# Patient Record
Sex: MALE | Race: WHITE | Employment: OTHER | URBAN - NONMETROPOLITAN AREA
[De-identification: names, ages, dates, MRNs, and addresses within clinical notes are randomized per-mention and may not be internally consistent; named-entity substitution may affect disease eponyms.]

---

## 2020-01-01 ENCOUNTER — APPOINTMENT (OUTPATIENT)
Dept: CT IMAGING | Facility: OTHER | Age: 80
End: 2020-01-01
Attending: FAMILY MEDICINE
Payer: MEDICARE

## 2020-01-01 ENCOUNTER — ANCILLARY PROCEDURE (OUTPATIENT)
Dept: ULTRASOUND IMAGING | Facility: OTHER | Age: 80
End: 2020-01-01
Attending: FAMILY MEDICINE
Payer: MEDICARE

## 2020-01-01 ENCOUNTER — APPOINTMENT (OUTPATIENT)
Dept: GENERAL RADIOLOGY | Facility: OTHER | Age: 80
DRG: 177 | End: 2020-01-01
Attending: FAMILY MEDICINE

## 2020-01-01 ENCOUNTER — TELEPHONE (OUTPATIENT)
Dept: FAMILY MEDICINE | Facility: OTHER | Age: 80
End: 2020-01-01

## 2020-01-01 ENCOUNTER — APPOINTMENT (OUTPATIENT)
Dept: PHYSICAL THERAPY | Facility: OTHER | Age: 80
DRG: 177 | End: 2020-01-01
Attending: FAMILY MEDICINE

## 2020-01-01 ENCOUNTER — APPOINTMENT (OUTPATIENT)
Dept: CT IMAGING | Facility: OTHER | Age: 80
DRG: 177 | End: 2020-01-01
Attending: FAMILY MEDICINE

## 2020-01-01 ENCOUNTER — ANESTHESIA EVENT (OUTPATIENT)
Dept: EMERGENCY MEDICINE | Facility: OTHER | Age: 80
DRG: 177 | End: 2020-01-01

## 2020-01-01 ENCOUNTER — ANESTHESIA (OUTPATIENT)
Dept: EMERGENCY MEDICINE | Facility: OTHER | Age: 80
DRG: 177 | End: 2020-01-01

## 2020-01-01 ENCOUNTER — APPOINTMENT (OUTPATIENT)
Dept: SPEECH THERAPY | Facility: OTHER | Age: 80
DRG: 177 | End: 2020-01-01
Attending: FAMILY MEDICINE

## 2020-01-01 ENCOUNTER — CARE COORDINATION (OUTPATIENT)
Dept: FAMILY MEDICINE | Facility: OTHER | Age: 80
End: 2020-01-01

## 2020-01-01 ENCOUNTER — APPOINTMENT (OUTPATIENT)
Dept: OCCUPATIONAL THERAPY | Facility: OTHER | Age: 80
End: 2020-01-01
Attending: STUDENT IN AN ORGANIZED HEALTH CARE EDUCATION/TRAINING PROGRAM
Payer: COMMERCIAL

## 2020-01-01 ENCOUNTER — HOSPITAL ENCOUNTER (INPATIENT)
Facility: OTHER | Age: 80
LOS: 5 days | Discharge: HOME HEALTH | DRG: 177 | End: 2020-10-24
Attending: FAMILY MEDICINE | Admitting: FAMILY MEDICINE
Payer: COMMERCIAL

## 2020-01-01 ENCOUNTER — TELEPHONE (OUTPATIENT)
Dept: BEHAVIORAL HEALTH | Facility: CLINIC | Age: 80
End: 2020-01-01

## 2020-01-01 ENCOUNTER — HOSPITAL ENCOUNTER (EMERGENCY)
Facility: OTHER | Age: 80
Discharge: HOME | End: 2020-10-29
Attending: FAMILY MEDICINE | Admitting: FAMILY MEDICINE
Payer: MEDICARE

## 2020-01-01 ENCOUNTER — APPOINTMENT (OUTPATIENT)
Dept: PHYSICAL THERAPY | Facility: OTHER | Age: 80
DRG: 177 | End: 2020-01-01

## 2020-01-01 ENCOUNTER — APPOINTMENT (OUTPATIENT)
Dept: GENERAL RADIOLOGY | Facility: OTHER | Age: 80
End: 2020-01-01
Attending: FAMILY MEDICINE
Payer: MEDICARE

## 2020-01-01 ENCOUNTER — HOSPITAL ENCOUNTER (EMERGENCY)
Facility: OTHER | Age: 80
Discharge: HOME | End: 2020-10-27
Attending: PHYSICIAN ASSISTANT | Admitting: PHYSICIAN ASSISTANT
Payer: COMMERCIAL

## 2020-01-01 ENCOUNTER — APPOINTMENT (OUTPATIENT)
Dept: OCCUPATIONAL THERAPY | Facility: OTHER | Age: 80
DRG: 177 | End: 2020-01-01

## 2020-01-01 ENCOUNTER — APPOINTMENT (OUTPATIENT)
Dept: SPEECH THERAPY | Facility: OTHER | Age: 80
DRG: 177 | End: 2020-01-01

## 2020-01-01 ENCOUNTER — PATIENT OUTREACH (OUTPATIENT)
Dept: CARE COORDINATION | Facility: CLINIC | Age: 80
End: 2020-01-01

## 2020-01-01 VITALS
WEIGHT: 315 LBS | TEMPERATURE: 96.2 F | SYSTOLIC BLOOD PRESSURE: 145 MMHG | RESPIRATION RATE: 18 BRPM | HEART RATE: 82 BPM | BODY MASS INDEX: 41.75 KG/M2 | HEIGHT: 73 IN | OXYGEN SATURATION: 94 % | DIASTOLIC BLOOD PRESSURE: 63 MMHG

## 2020-01-01 VITALS
SYSTOLIC BLOOD PRESSURE: 147 MMHG | OXYGEN SATURATION: 93 % | RESPIRATION RATE: 20 BRPM | DIASTOLIC BLOOD PRESSURE: 76 MMHG | TEMPERATURE: 98 F | HEART RATE: 93 BPM | WEIGHT: 315 LBS | BODY MASS INDEX: 48.55 KG/M2

## 2020-01-01 VITALS
BODY MASS INDEX: 42.66 KG/M2 | HEART RATE: 86 BPM | DIASTOLIC BLOOD PRESSURE: 53 MMHG | RESPIRATION RATE: 20 BRPM | SYSTOLIC BLOOD PRESSURE: 116 MMHG | HEIGHT: 72 IN | TEMPERATURE: 96.4 F | WEIGHT: 315 LBS | OXYGEN SATURATION: 94 %
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
## 2020-01-01 DIAGNOSIS — F07.81 POST CONCUSSIVE SYNDROME: ICD-10-CM

## 2020-01-01 DIAGNOSIS — F43.10 PTSD (POST-TRAUMATIC STRESS DISORDER): ICD-10-CM

## 2020-01-01 DIAGNOSIS — F03.918 SENILE DEMENTIA, WITH BEHAVIORAL DISTURBANCE (H): ICD-10-CM

## 2020-01-01 DIAGNOSIS — S01.81XA FACIAL LACERATION, INITIAL ENCOUNTER: ICD-10-CM

## 2020-01-01 DIAGNOSIS — T14.90XA TRAUMA: ICD-10-CM

## 2020-01-01 DIAGNOSIS — J18.9 COMMUNITY ACQUIRED PNEUMONIA, UNSPECIFIED LATERALITY: ICD-10-CM

## 2020-01-01 DIAGNOSIS — J18.9 PNEUMONIA DUE TO INFECTIOUS ORGANISM, UNSPECIFIED LATERALITY, UNSPECIFIED PART OF LUNG: ICD-10-CM

## 2020-01-01 DIAGNOSIS — J69.0 ASPIRATION PNEUMONIA, UNSPECIFIED ASPIRATION PNEUMONIA TYPE, UNSPECIFIED LATERALITY, UNSPECIFIED PART OF LUNG (H): ICD-10-CM

## 2020-01-01 DIAGNOSIS — Z20.828 CONTACT WITH AND (SUSPECTED) EXPOSURE TO OTHER VIRAL COMMUNICABLE DISEASES: ICD-10-CM

## 2020-01-01 DIAGNOSIS — Z74.09 IMPAIRED MOBILITY AND ADLS: ICD-10-CM
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
## 2020-01-01 DIAGNOSIS — J43.1 PANLOBULAR EMPHYSEMA (H): ICD-10-CM

## 2020-01-01 DIAGNOSIS — S20.219D CONTUSION OF CHEST WALL, UNSPECIFIED LATERALITY, SUBSEQUENT ENCOUNTER: ICD-10-CM
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
## 2020-01-01 DIAGNOSIS — J69.0 ASPIRATION PNEUMONIA, UNSPECIFIED ASPIRATION PNEUMONIA TYPE, UNSPECIFIED LATERALITY, UNSPECIFIED PART OF LUNG (H): Primary | ICD-10-CM

## 2020-01-01 DIAGNOSIS — R41.0 DELIRIUM: ICD-10-CM

## 2020-01-01 DIAGNOSIS — S01.111A RIGHT EYELID LACERATION, INITIAL ENCOUNTER: ICD-10-CM

## 2020-01-01 DIAGNOSIS — Z78.9 IMPAIRED MOBILITY AND ADLS: ICD-10-CM
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
## 2020-01-01 DIAGNOSIS — Z99.81 SUPPLEMENTAL OXYGEN DEPENDENT: ICD-10-CM

## 2020-01-01 DIAGNOSIS — S22.42XD CLOSED FRACTURE OF MULTIPLE RIBS OF LEFT SIDE WITH ROUTINE HEALING, SUBSEQUENT ENCOUNTER: ICD-10-CM

## 2020-01-01 LAB
ABO + RH BLD: NORMAL
ALBUMIN SERPL-MCNC: 3.6 G/DL (ref 3.5–5.7)
ALBUMIN SERPL-MCNC: 3.8 G/DL (ref 3.5–5.7)
ALBUMIN SERPL-MCNC: 4 G/DL (ref 3.5–5.7)
ALBUMIN UR-MCNC: NEGATIVE MG/DL
ALBUMIN UR-MCNC: NEGATIVE MG/DL
ALP SERPL-CCNC: 53 U/L (ref 34–104)
ALP SERPL-CCNC: 59 U/L (ref 34–104)
ALP SERPL-CCNC: 66 U/L (ref 34–104)
ALT SERPL W P-5'-P-CCNC: 36 U/L (ref 7–52)
ALT SERPL W P-5'-P-CCNC: 40 U/L (ref 7–52)
ALT SERPL W P-5'-P-CCNC: 42 U/L (ref 7–52)
ANION GAP SERPL CALCULATED.3IONS-SCNC: 4 MMOL/L (ref 3–14)
ANION GAP SERPL CALCULATED.3IONS-SCNC: 5 MMOL/L (ref 3–14)
ANION GAP SERPL CALCULATED.3IONS-SCNC: 7 MMOL/L (ref 3–14)
ANION GAP SERPL CALCULATED.3IONS-SCNC: 7 MMOL/L (ref 3–14)
ANION GAP SERPL CALCULATED.3IONS-SCNC: 8 MMOL/L (ref 3–14)
APPEARANCE UR: CLEAR
APPEARANCE UR: CLEAR
APTT PPP: 27 SEC (ref 22–37)
AST SERPL W P-5'-P-CCNC: 33 U/L (ref 13–39)
AST SERPL W P-5'-P-CCNC: 47 U/L (ref 13–39)
AST SERPL W P-5'-P-CCNC: 56 U/L (ref 13–39)
BACTERIA SPEC CULT: NORMAL
BACTERIA SPEC CULT: NORMAL
BASOPHILS # BLD AUTO: 0 10E9/L (ref 0–0.2)
BASOPHILS # BLD AUTO: 0.1 10E9/L (ref 0–0.2)
BASOPHILS NFR BLD AUTO: 0.2 %
BASOPHILS NFR BLD AUTO: 0.3 %
BASOPHILS NFR BLD AUTO: 0.7 %
BASOPHILS NFR BLD AUTO: 0.8 %
BILIRUB SERPL-MCNC: 0.3 MG/DL (ref 0.3–1)
BILIRUB SERPL-MCNC: 0.4 MG/DL (ref 0.3–1)
BILIRUB SERPL-MCNC: 0.4 MG/DL (ref 0.3–1)
BILIRUB UR QL STRIP: NEGATIVE
BILIRUB UR QL STRIP: NEGATIVE
BLD GP AB SCN SERPL QL: NORMAL
BLD GP AB SCN SERPL QL: NORMAL
BLOOD BANK CMNT PATIENT-IMP: NORMAL
BLOOD BANK CMNT PATIENT-IMP: NORMAL
BUN SERPL-MCNC: 19 MG/DL (ref 7–25)
BUN SERPL-MCNC: 20 MG/DL (ref 7–25)
BUN SERPL-MCNC: 23 MG/DL (ref 7–25)
BUN SERPL-MCNC: 29 MG/DL (ref 7–25)
BUN SERPL-MCNC: 32 MG/DL (ref 7–25)
CALCIUM SERPL-MCNC: 8.6 MG/DL (ref 8.6–10.3)
CALCIUM SERPL-MCNC: 8.7 MG/DL (ref 8.6–10.3)
CALCIUM SERPL-MCNC: 8.9 MG/DL (ref 8.6–10.3)
CALCIUM SERPL-MCNC: 9.1 MG/DL (ref 8.6–10.3)
CALCIUM SERPL-MCNC: 9.4 MG/DL (ref 8.6–10.3)
CHLORIDE SERPL-SCNC: 93 MMOL/L (ref 98–107)
CHLORIDE SERPL-SCNC: 94 MMOL/L (ref 98–107)
CHLORIDE SERPL-SCNC: 95 MMOL/L (ref 98–107)
CHLORIDE SERPL-SCNC: 97 MMOL/L (ref 98–107)
CHLORIDE SERPL-SCNC: 98 MMOL/L (ref 98–107)
CO2 SERPL-SCNC: 30 MMOL/L (ref 21–31)
CO2 SERPL-SCNC: 33 MMOL/L (ref 21–31)
CO2 SERPL-SCNC: 34 MMOL/L (ref 21–31)
COLOR UR AUTO: NORMAL
COLOR UR AUTO: NORMAL
CREAT SERPL-MCNC: 1.03 MG/DL (ref 0.7–1.3)
CREAT SERPL-MCNC: 1.04 MG/DL (ref 0.7–1.3)
CREAT SERPL-MCNC: 1.12 MG/DL (ref 0.7–1.3)
CREAT SERPL-MCNC: 1.19 MG/DL (ref 0.7–1.3)
CREAT SERPL-MCNC: 1.26 MG/DL (ref 0.7–1.3)
DIFFERENTIAL METHOD BLD: ABNORMAL
EOSINOPHIL # BLD AUTO: 0 10E9/L (ref 0–0.7)
EOSINOPHIL # BLD AUTO: 0.2 10E9/L (ref 0–0.7)
EOSINOPHIL # BLD AUTO: 0.3 10E9/L (ref 0–0.7)
EOSINOPHIL # BLD AUTO: 0.3 10E9/L (ref 0–0.7)
EOSINOPHIL NFR BLD AUTO: 0.1 %
EOSINOPHIL NFR BLD AUTO: 1.4 %
EOSINOPHIL NFR BLD AUTO: 3.2 %
EOSINOPHIL NFR BLD AUTO: 3.9 %
ERYTHROCYTE [DISTWIDTH] IN BLOOD BY AUTOMATED COUNT: 14.4 % (ref 10–15)
ERYTHROCYTE [DISTWIDTH] IN BLOOD BY AUTOMATED COUNT: 14.6 % (ref 10–15)
ERYTHROCYTE [DISTWIDTH] IN BLOOD BY AUTOMATED COUNT: 14.7 % (ref 10–15)
ERYTHROCYTE [DISTWIDTH] IN BLOOD BY AUTOMATED COUNT: 14.8 % (ref 10–15)
ERYTHROCYTE [DISTWIDTH] IN BLOOD BY AUTOMATED COUNT: 15.3 % (ref 10–15)
ETHANOL SERPL-MCNC: <0.01 %
GFR SERPL CREATININE-BSD FRML MDRD: 55 ML/MIN/{1.73_M2}
GFR SERPL CREATININE-BSD FRML MDRD: 59 ML/MIN/{1.73_M2}
GFR SERPL CREATININE-BSD FRML MDRD: 63 ML/MIN/{1.73_M2}
GFR SERPL CREATININE-BSD FRML MDRD: 69 ML/MIN/{1.73_M2}
GFR SERPL CREATININE-BSD FRML MDRD: 70 ML/MIN/{1.73_M2}
GLUCOSE SERPL-MCNC: 144 MG/DL (ref 70–105)
GLUCOSE SERPL-MCNC: 216 MG/DL (ref 70–105)
GLUCOSE SERPL-MCNC: 250 MG/DL (ref 70–105)
GLUCOSE SERPL-MCNC: 255 MG/DL (ref 70–105)
GLUCOSE SERPL-MCNC: 307 MG/DL (ref 70–105)
GLUCOSE UR STRIP-MCNC: NEGATIVE MG/DL
GLUCOSE UR STRIP-MCNC: NEGATIVE MG/DL
HBA1C MFR BLD: 7.9 % (ref 4–6)
HCO3 BLD-SCNC: 29 MMOL/L (ref 21–28)
HCO3 BLDV-SCNC: 36 MMOL/L (ref 21–28)
HCT VFR BLD AUTO: 30.8 % (ref 40–53)
HCT VFR BLD AUTO: 33 % (ref 40–53)
HCT VFR BLD AUTO: 33.6 % (ref 40–53)
HCT VFR BLD AUTO: 35.6 % (ref 40–53)
HCT VFR BLD AUTO: 37 % (ref 40–53)
HGB BLD-MCNC: 10 G/DL (ref 13.3–17.7)
HGB BLD-MCNC: 10.2 G/DL (ref 13.3–17.7)
HGB BLD-MCNC: 10.9 G/DL (ref 13.3–17.7)
HGB BLD-MCNC: 11.5 G/DL (ref 13.3–17.7)
HGB BLD-MCNC: 9.5 G/DL (ref 13.3–17.7)
HGB UR QL STRIP: NEGATIVE
HGB UR QL STRIP: NEGATIVE
IMM GRANULOCYTES # BLD: 0.1 10E9/L (ref 0–0.4)
IMM GRANULOCYTES NFR BLD: 0.5 %
IMM GRANULOCYTES NFR BLD: 0.6 %
IMM GRANULOCYTES NFR BLD: 0.8 %
IMM GRANULOCYTES NFR BLD: 0.9 %
INR PPP: 0.91 (ref 0.86–1.14)
INTERPRETATION ECG - MUSE: NORMAL
KETONES UR STRIP-MCNC: NEGATIVE MG/DL
KETONES UR STRIP-MCNC: NEGATIVE MG/DL
LABORATORY COMMENT REPORT: NORMAL
LACTATE BLD-SCNC: 0.7 MMOL/L (ref 0.7–2)
LACTATE BLD-SCNC: 1 MMOL/L (ref 0.7–2)
LACTATE BLD-SCNC: 1.6 MMOL/L (ref 0.7–2)
LACTATE BLD-SCNC: 1.7 MMOL/L (ref 0.7–2)
LEUKOCYTE ESTERASE UR QL STRIP: NEGATIVE
LEUKOCYTE ESTERASE UR QL STRIP: NEGATIVE
LIPASE SERPL-CCNC: 240 U/L (ref 11–82)
LYMPHOCYTES # BLD AUTO: 0.8 10E9/L (ref 0.8–5.3)
LYMPHOCYTES # BLD AUTO: 1 10E9/L (ref 0.8–5.3)
LYMPHOCYTES # BLD AUTO: 1.7 10E9/L (ref 0.8–5.3)
LYMPHOCYTES # BLD AUTO: 1.7 10E9/L (ref 0.8–5.3)
LYMPHOCYTES NFR BLD AUTO: 11.1 %
LYMPHOCYTES NFR BLD AUTO: 16.6 %
LYMPHOCYTES NFR BLD AUTO: 6.9 %
LYMPHOCYTES NFR BLD AUTO: 9.5 %
MAGNESIUM SERPL-MCNC: 1.6 MG/DL (ref 1.9–2.7)
MAGNESIUM SERPL-MCNC: 1.9 MG/DL (ref 1.9–2.7)
MCH RBC QN AUTO: 25.4 PG (ref 26.5–33)
MCH RBC QN AUTO: 26 PG (ref 26.5–33)
MCH RBC QN AUTO: 26.1 PG (ref 26.5–33)
MCH RBC QN AUTO: 26.1 PG (ref 26.5–33)
MCH RBC QN AUTO: 26.2 PG (ref 26.5–33)
MCHC RBC AUTO-ENTMCNC: 29.8 G/DL (ref 31.5–36.5)
MCHC RBC AUTO-ENTMCNC: 30.6 G/DL (ref 31.5–36.5)
MCHC RBC AUTO-ENTMCNC: 30.8 G/DL (ref 31.5–36.5)
MCHC RBC AUTO-ENTMCNC: 30.9 G/DL (ref 31.5–36.5)
MCHC RBC AUTO-ENTMCNC: 31.1 G/DL (ref 31.5–36.5)
MCV RBC AUTO: 84 FL (ref 78–100)
MCV RBC AUTO: 84 FL (ref 78–100)
MCV RBC AUTO: 85 FL (ref 78–100)
MCV RBC AUTO: 85 FL (ref 78–100)
MCV RBC AUTO: 86 FL (ref 78–100)
MONOCYTES # BLD AUTO: 0.8 10E9/L (ref 0–1.3)
MONOCYTES # BLD AUTO: 0.9 10E9/L (ref 0–1.3)
MONOCYTES # BLD AUTO: 1 10E9/L (ref 0–1.3)
MONOCYTES # BLD AUTO: 1.1 10E9/L (ref 0–1.3)
MONOCYTES NFR BLD AUTO: 6.8 %
MONOCYTES NFR BLD AUTO: 7.6 %
MONOCYTES NFR BLD AUTO: 8.7 %
MONOCYTES NFR BLD AUTO: 9 %
NEUTROPHILS # BLD AUTO: 11.9 10E9/L (ref 1.6–8.3)
NEUTROPHILS # BLD AUTO: 11.9 10E9/L (ref 1.6–8.3)
NEUTROPHILS # BLD AUTO: 6.5 10E9/L (ref 1.6–8.3)
NEUTROPHILS # BLD AUTO: 7.1 10E9/L (ref 1.6–8.3)
NEUTROPHILS NFR BLD AUTO: 70 %
NEUTROPHILS NFR BLD AUTO: 76.2 %
NEUTROPHILS NFR BLD AUTO: 79.5 %
NEUTROPHILS NFR BLD AUTO: 84.7 %
NITRATE UR QL: NEGATIVE
NITRATE UR QL: NEGATIVE
NT-PROBNP SERPL-MCNC: 77 PG/ML (ref 0–100)
O2/TOTAL GAS SETTING VFR VENT: 28 %
O2/TOTAL GAS SETTING VFR VENT: 60 %
O2/TOTAL GAS SETTING VFR VENT: ABNORMAL %
O2/TOTAL GAS SETTING VFR VENT: ABNORMAL %
OXYHGB MFR BLD: 91 % (ref 92–100)
OXYHGB MFR BLD: 92 % (ref 92–100)
OXYHGB MFR BLD: 96 % (ref 92–100)
OXYHGB MFR BLDV: 47 %
PCO2 BLD: 52 MM HG (ref 35–45)
PCO2 BLD: 56 MM HG (ref 35–45)
PCO2 BLD: 58 MM HG (ref 35–45)
PCO2 BLDV: 56 MM HG (ref 40–50)
PH BLD: 7.31 PH (ref 7.35–7.45)
PH BLD: 7.33 PH (ref 7.35–7.45)
PH BLD: 7.35 PH (ref 7.35–7.45)
PH BLDV: 7.41 PH (ref 7.32–7.43)
PH UR STRIP: 5.5 PH (ref 5–7)
PH UR STRIP: 7 PH (ref 5–7)
PLATELET # BLD AUTO: 278 10E9/L (ref 150–450)
PLATELET # BLD AUTO: 297 10E9/L (ref 150–450)
PLATELET # BLD AUTO: 307 10E9/L (ref 150–450)
PLATELET # BLD AUTO: 308 10E9/L (ref 150–450)
PLATELET # BLD AUTO: 333 10E9/L (ref 150–450)
PO2 BLD: 66 MM HG (ref 80–105)
PO2 BLD: 71 MM HG (ref 80–105)
PO2 BLD: 90 MM HG (ref 80–105)
PO2 BLDV: 29 MM HG (ref 25–47)
POTASSIUM SERPL-SCNC: 4.2 MMOL/L (ref 3.5–5.1)
POTASSIUM SERPL-SCNC: 4.7 MMOL/L (ref 3.5–5.1)
POTASSIUM SERPL-SCNC: 4.8 MMOL/L (ref 3.5–5.1)
PROT SERPL-MCNC: 6.3 G/DL (ref 6.4–8.9)
PROT SERPL-MCNC: 6.7 G/DL (ref 6.4–8.9)
PROT SERPL-MCNC: 7 G/DL (ref 6.4–8.9)
RBC # BLD AUTO: 3.66 10E12/L (ref 4.4–5.9)
RBC # BLD AUTO: 3.9 10E12/L (ref 4.4–5.9)
RBC # BLD AUTO: 3.93 10E12/L (ref 4.4–5.9)
RBC # BLD AUTO: 4.17 10E12/L (ref 4.4–5.9)
RBC # BLD AUTO: 4.4 10E12/L (ref 4.4–5.9)
SARS-COV-2 RNA SPEC QL NAA+PROBE: NEGATIVE
SARS-COV-2 RNA SPEC QL NAA+PROBE: NORMAL
SODIUM SERPL-SCNC: 131 MMOL/L (ref 134–144)
SODIUM SERPL-SCNC: 132 MMOL/L (ref 134–144)
SODIUM SERPL-SCNC: 133 MMOL/L (ref 134–144)
SODIUM SERPL-SCNC: 134 MMOL/L (ref 134–144)
SODIUM SERPL-SCNC: 135 MMOL/L (ref 134–144)
SOURCE: NORMAL
SOURCE: NORMAL
SP GR UR STRIP: 1.01 (ref 1–1.03)
SP GR UR STRIP: 1.03 (ref 1–1.03)
SPECIMEN EXP DATE BLD: NORMAL
SPECIMEN EXP DATE BLD: NORMAL
SPECIMEN SOURCE: NORMAL
TROPONIN I SERPL-MCNC: 12.6 PG/ML
UROBILINOGEN UR STRIP-MCNC: NORMAL MG/DL (ref 0–2)
UROBILINOGEN UR STRIP-MCNC: NORMAL MG/DL (ref 0–2)
WBC # BLD AUTO: 10.1 10E9/L (ref 4–11)
WBC # BLD AUTO: 11.7 10E9/L (ref 4–11)
WBC # BLD AUTO: 13.9 10E9/L (ref 4–11)
WBC # BLD AUTO: 15 10E9/L (ref 4–11)
WBC # BLD AUTO: 8.5 10E9/L (ref 4–11)

## 2020-01-01 PROCEDURE — 97129 THER IVNTJ 1ST 15 MIN: CPT | Performed by: OCCUPATIONAL THERAPIST

## 2020-01-01 PROCEDURE — 999N000157 HC STATISTIC RCP TIME EA 10 MIN

## 2020-01-01 PROCEDURE — 36415 COLL VENOUS BLD VENIPUNCTURE: CPT | Performed by: FAMILY MEDICINE

## 2020-01-01 PROCEDURE — 250N000011 HC RX IP 250 OP 636: Performed by: FAMILY MEDICINE

## 2020-01-01 PROCEDURE — 97165 OT EVAL LOW COMPLEX 30 MIN: CPT | Mod: GO | Performed by: OCCUPATIONAL THERAPIST

## 2020-01-01 PROCEDURE — 250N000013 HC RX MED GY IP 250 OP 250 PS 637: Performed by: FAMILY MEDICINE

## 2020-01-01 PROCEDURE — 250N000009 HC RX 250: Performed by: FAMILY MEDICINE

## 2020-01-01 PROCEDURE — 96365 THER/PROPH/DIAG IV INF INIT: CPT | Performed by: FAMILY MEDICINE

## 2020-01-01 PROCEDURE — 120N000001 HC R&B MED SURG/OB

## 2020-01-01 PROCEDURE — 85027 COMPLETE CBC AUTOMATED: CPT | Performed by: FAMILY MEDICINE

## 2020-01-01 PROCEDURE — 70486 CT MAXILLOFACIAL W/O DYE: CPT

## 2020-01-01 PROCEDURE — 70486 CT MAXILLOFACIAL W/O DYE: CPT | Performed by: NURSE ANESTHETIST, CERTIFIED REGISTERED

## 2020-01-01 PROCEDURE — 83605 ASSAY OF LACTIC ACID: CPT | Performed by: FAMILY MEDICINE

## 2020-01-01 PROCEDURE — 84484 ASSAY OF TROPONIN QUANT: CPT | Performed by: FAMILY MEDICINE

## 2020-01-01 PROCEDURE — 99285 EMERGENCY DEPT VISIT HI MDM: CPT | Mod: 25 | Performed by: FAMILY MEDICINE

## 2020-01-01 PROCEDURE — 93005 ELECTROCARDIOGRAM TRACING: CPT | Mod: 76 | Performed by: FAMILY MEDICINE

## 2020-01-01 PROCEDURE — 83735 ASSAY OF MAGNESIUM: CPT | Performed by: FAMILY MEDICINE

## 2020-01-01 PROCEDURE — 71260 CT THORAX DX C+: CPT

## 2020-01-01 PROCEDURE — 80053 COMPREHEN METABOLIC PANEL: CPT | Performed by: FAMILY MEDICINE

## 2020-01-01 PROCEDURE — 80048 BASIC METABOLIC PNL TOTAL CA: CPT | Performed by: PHYSICIAN ASSISTANT

## 2020-01-01 PROCEDURE — 258N000003 HC RX IP 258 OP 636: Performed by: FAMILY MEDICINE

## 2020-01-01 PROCEDURE — 36415 COLL VENOUS BLD VENIPUNCTURE: CPT | Performed by: PHYSICIAN ASSISTANT

## 2020-01-01 PROCEDURE — 85730 THROMBOPLASTIN TIME PARTIAL: CPT | Performed by: FAMILY MEDICINE

## 2020-01-01 PROCEDURE — 12011 RPR F/E/E/N/L/M 2.5 CM/<: CPT | Performed by: FAMILY MEDICINE

## 2020-01-01 PROCEDURE — 71045 X-RAY EXAM CHEST 1 VIEW: CPT

## 2020-01-01 PROCEDURE — 99285 EMERGENCY DEPT VISIT HI MDM: CPT | Performed by: FAMILY MEDICINE

## 2020-01-01 PROCEDURE — 96376 TX/PRO/DX INJ SAME DRUG ADON: CPT | Performed by: FAMILY MEDICINE

## 2020-01-01 PROCEDURE — 96372 THER/PROPH/DIAG INJ SC/IM: CPT | Performed by: FAMILY MEDICINE

## 2020-01-01 PROCEDURE — 86900 BLOOD TYPING SEROLOGIC ABO: CPT | Performed by: FAMILY MEDICINE

## 2020-01-01 PROCEDURE — 92526 ORAL FUNCTION THERAPY: CPT | Mod: GN

## 2020-01-01 PROCEDURE — 82805 BLOOD GASES W/O2 SATURATION: CPT | Performed by: FAMILY MEDICINE

## 2020-01-01 PROCEDURE — 250N000012 HC RX MED GY IP 250 OP 636 PS 637: Performed by: FAMILY MEDICINE

## 2020-01-01 PROCEDURE — 86901 BLOOD TYPING SEROLOGIC RH(D): CPT | Performed by: FAMILY MEDICINE

## 2020-01-01 PROCEDURE — 76705 ECHO EXAM OF ABDOMEN: CPT | Mod: TC | Performed by: FAMILY MEDICINE

## 2020-01-01 PROCEDURE — 80048 BASIC METABOLIC PNL TOTAL CA: CPT | Performed by: FAMILY MEDICINE

## 2020-01-01 PROCEDURE — 72131 CT LUMBAR SPINE W/O DYE: CPT

## 2020-01-01 PROCEDURE — 255N000002 HC RX 255 OP 636: Performed by: FAMILY MEDICINE

## 2020-01-01 PROCEDURE — 250N000013 HC RX MED GY IP 250 OP 250 PS 637: Performed by: STUDENT IN AN ORGANIZED HEALTH CARE EDUCATION/TRAINING PROGRAM

## 2020-01-01 PROCEDURE — 97116 GAIT TRAINING THERAPY: CPT | Mod: GP

## 2020-01-01 PROCEDURE — 99232 SBSQ HOSP IP/OBS MODERATE 35: CPT | Performed by: FAMILY MEDICINE

## 2020-01-01 PROCEDURE — U0003 INFECTIOUS AGENT DETECTION BY NUCLEIC ACID (DNA OR RNA); SEVERE ACUTE RESPIRATORY SYNDROME CORONAVIRUS 2 (SARS-COV-2) (CORONAVIRUS DISEASE [COVID-19]), AMPLIFIED PROBE TECHNIQUE, MAKING USE OF HIGH THROUGHPUT TECHNOLOGIES AS DESCRIBED BY CMS-2020-01-R: HCPCS | Performed by: FAMILY MEDICINE

## 2020-01-01 PROCEDURE — 96375 TX/PRO/DX INJ NEW DRUG ADDON: CPT | Performed by: FAMILY MEDICINE

## 2020-01-01 PROCEDURE — 85025 COMPLETE CBC W/AUTO DIFF WBC: CPT | Performed by: FAMILY MEDICINE

## 2020-01-01 PROCEDURE — 99284 EMERGENCY DEPT VISIT MOD MDM: CPT | Performed by: FAMILY MEDICINE

## 2020-01-01 PROCEDURE — 72125 CT NECK SPINE W/O DYE: CPT

## 2020-01-01 PROCEDURE — C9803 HOPD COVID-19 SPEC COLLECT: HCPCS | Performed by: FAMILY MEDICINE

## 2020-01-01 PROCEDURE — 83690 ASSAY OF LIPASE: CPT | Performed by: FAMILY MEDICINE

## 2020-01-01 PROCEDURE — 86850 RBC ANTIBODY SCREEN: CPT | Performed by: FAMILY MEDICINE

## 2020-01-01 PROCEDURE — 683N000002 HC PARTIAL TRAUMA W/O CC LEVEL III: Performed by: FAMILY MEDICINE

## 2020-01-01 PROCEDURE — 85025 COMPLETE CBC W/AUTO DIFF WBC: CPT | Performed by: PHYSICIAN ASSISTANT

## 2020-01-01 PROCEDURE — 36600 WITHDRAWAL OF ARTERIAL BLOOD: CPT | Performed by: FAMILY MEDICINE

## 2020-01-01 PROCEDURE — 81003 URINALYSIS AUTO W/O SCOPE: CPT | Performed by: FAMILY MEDICINE

## 2020-01-01 PROCEDURE — 96366 THER/PROPH/DIAG IV INF ADDON: CPT | Performed by: FAMILY MEDICINE

## 2020-01-01 PROCEDURE — 200N000001 HC R&B ICU

## 2020-01-01 PROCEDURE — 70450 CT HEAD/BRAIN W/O DYE: CPT

## 2020-01-01 PROCEDURE — 97530 THERAPEUTIC ACTIVITIES: CPT | Mod: GP

## 2020-01-01 PROCEDURE — 92610 EVALUATE SWALLOWING FUNCTION: CPT | Mod: GN

## 2020-01-01 PROCEDURE — 99223 1ST HOSP IP/OBS HIGH 75: CPT | Performed by: FAMILY MEDICINE

## 2020-01-01 PROCEDURE — 93005 ELECTROCARDIOGRAM TRACING: CPT | Performed by: FAMILY MEDICINE

## 2020-01-01 PROCEDURE — 250N000011 HC RX IP 250 OP 636: Performed by: NURSE ANESTHETIST, CERTIFIED REGISTERED

## 2020-01-01 PROCEDURE — 90471 IMMUNIZATION ADMIN: CPT | Performed by: FAMILY MEDICINE

## 2020-01-01 PROCEDURE — 87040 BLOOD CULTURE FOR BACTERIA: CPT | Performed by: FAMILY MEDICINE

## 2020-01-01 PROCEDURE — 250N000013 HC RX MED GY IP 250 OP 250 PS 637: Performed by: PHYSICIAN ASSISTANT

## 2020-01-01 PROCEDURE — 80320 DRUG SCREEN QUANTALCOHOLS: CPT | Performed by: FAMILY MEDICINE

## 2020-01-01 PROCEDURE — 0HQ1XZZ REPAIR FACE SKIN, EXTERNAL APPROACH: ICD-10-PCS | Performed by: FAMILY MEDICINE

## 2020-01-01 PROCEDURE — 83880 ASSAY OF NATRIURETIC PEPTIDE: CPT | Performed by: FAMILY MEDICINE

## 2020-01-01 PROCEDURE — 93010 ELECTROCARDIOGRAM REPORT: CPT | Performed by: INTERNAL MEDICINE

## 2020-01-01 PROCEDURE — 81003 URINALYSIS AUTO W/O SCOPE: CPT | Performed by: PHYSICIAN ASSISTANT

## 2020-01-01 PROCEDURE — 250N000013 HC RX MED GY IP 250 OP 250 PS 637: Mod: GY | Performed by: FAMILY MEDICINE

## 2020-01-01 PROCEDURE — 999N000215 HC STATISTIC HFNC ADULT NON-CPAP

## 2020-01-01 PROCEDURE — 97161 PT EVAL LOW COMPLEX 20 MIN: CPT | Mod: GP

## 2020-01-01 PROCEDURE — 83036 HEMOGLOBIN GLYCOSYLATED A1C: CPT | Performed by: FAMILY MEDICINE

## 2020-01-01 PROCEDURE — 74177 CT ABD & PELVIS W/CONTRAST: CPT

## 2020-01-01 PROCEDURE — 72128 CT CHEST SPINE W/O DYE: CPT

## 2020-01-01 PROCEDURE — 85610 PROTHROMBIN TIME: CPT | Performed by: FAMILY MEDICINE

## 2020-01-01 PROCEDURE — 76705 ECHO EXAM OF ABDOMEN: CPT | Mod: 26 | Performed by: FAMILY MEDICINE

## 2020-01-01 PROCEDURE — 97530 THERAPEUTIC ACTIVITIES: CPT | Mod: GO | Performed by: OCCUPATIONAL THERAPIST

## 2020-01-01 PROCEDURE — 99140 ANES COMP EMERGENCY COND: CPT | Performed by: NURSE ANESTHETIST, CERTIFIED REGISTERED

## 2020-01-01 PROCEDURE — 99100 ANES PT EXTEME AGE<1 YR&>70: CPT | Performed by: NURSE ANESTHETIST, CERTIFIED REGISTERED

## 2020-01-01 PROCEDURE — 99238 HOSP IP/OBS DSCHRG MGMT 30/<: CPT | Performed by: FAMILY MEDICINE

## 2020-01-01 PROCEDURE — 90715 TDAP VACCINE 7 YRS/> IM: CPT | Performed by: FAMILY MEDICINE

## 2020-01-01 RX ORDER — ONDANSETRON 4 MG/1
4 TABLET, ORALLY DISINTEGRATING ORAL EVERY 6 HOURS PRN
Status: DISCONTINUED | OUTPATIENT
Start: 2020-01-01 | End: 2020-01-01 | Stop reason: HOSPADM

## 2020-01-01 RX ORDER — IODIXANOL 320 MG/ML
100 INJECTION, SOLUTION INTRAVASCULAR ONCE
Status: DISCONTINUED | OUTPATIENT
Start: 2020-01-01 | End: 2020-01-01 | Stop reason: HOSPADM

## 2020-01-01 RX ORDER — TRIAMCINOLONE ACETONIDE 1 MG/G
CREAM TOPICAL 2 TIMES DAILY PRN
COMMUNITY

## 2020-01-01 RX ORDER — LANOLIN ALCOHOL/MO/W.PET/CERES
100 CREAM (GRAM) TOPICAL DAILY
Status: DISCONTINUED | OUTPATIENT
Start: 2020-01-01 | End: 2020-01-01 | Stop reason: HOSPADM

## 2020-01-01 RX ORDER — MAGNESIUM OXIDE 400 MG/1
400 TABLET ORAL DAILY
Status: DISCONTINUED | OUTPATIENT
Start: 2020-01-01 | End: 2020-01-01 | Stop reason: HOSPADM

## 2020-01-01 RX ORDER — ALBUTEROL SULFATE 0.83 MG/ML
2.5 SOLUTION RESPIRATORY (INHALATION) EVERY 6 HOURS PRN
Status: DISCONTINUED | OUTPATIENT
Start: 2020-01-01 | End: 2020-01-01 | Stop reason: HOSPADM

## 2020-01-01 RX ORDER — OXYCODONE AND ACETAMINOPHEN 5; 325 MG/1; MG/1
1 TABLET ORAL
Qty: 6 TABLET | Refills: 0 | Status: SHIPPED | OUTPATIENT
Start: 2020-01-01

## 2020-01-01 RX ORDER — LORAZEPAM 0.5 MG/1
0.5 TABLET ORAL EVERY 4 HOURS PRN
Status: DISCONTINUED | OUTPATIENT
Start: 2020-01-01 | End: 2020-01-01 | Stop reason: HOSPADM

## 2020-01-01 RX ORDER — SODIUM CHLORIDE, SODIUM LACTATE, POTASSIUM CHLORIDE, CALCIUM CHLORIDE 600; 310; 30; 20 MG/100ML; MG/100ML; MG/100ML; MG/100ML
INJECTION, SOLUTION INTRAVENOUS CONTINUOUS
Status: DISCONTINUED | OUTPATIENT
Start: 2020-01-01 | End: 2020-01-01 | Stop reason: DRUGHIGH

## 2020-01-01 RX ORDER — ACETAMINOPHEN 325 MG/1
975 TABLET ORAL ONCE
Status: COMPLETED | OUTPATIENT
Start: 2020-01-01 | End: 2020-01-01

## 2020-01-01 RX ORDER — PROPOFOL 10 MG/ML
INJECTION, EMULSION INTRAVENOUS PRN
Status: DISCONTINUED | OUTPATIENT
Start: 2020-01-01 | End: 2020-01-01

## 2020-01-01 RX ORDER — POTASSIUM CHLORIDE 7.45 MG/ML
10 INJECTION INTRAVENOUS
Status: DISCONTINUED | OUTPATIENT
Start: 2020-01-01 | End: 2020-01-01

## 2020-01-01 RX ORDER — ASPIRIN 81 MG/1
81 TABLET ORAL
Status: DISCONTINUED | OUTPATIENT
Start: 2020-01-01 | End: 2020-01-01 | Stop reason: HOSPADM

## 2020-01-01 RX ORDER — LORAZEPAM 1 MG/1
1-2 TABLET ORAL EVERY 30 MIN PRN
Status: DISCONTINUED | OUTPATIENT
Start: 2020-01-01 | End: 2020-01-01

## 2020-01-01 RX ORDER — AMOXICILLIN 250 MG
1 CAPSULE ORAL 2 TIMES DAILY PRN
Status: DISCONTINUED | OUTPATIENT
Start: 2020-01-01 | End: 2020-01-01 | Stop reason: HOSPADM

## 2020-01-01 RX ORDER — ATORVASTATIN CALCIUM 40 MG/1
40 TABLET, FILM COATED ORAL AT BEDTIME
Status: DISCONTINUED | OUTPATIENT
Start: 2020-01-01 | End: 2020-01-01 | Stop reason: HOSPADM

## 2020-01-01 RX ORDER — FENTANYL CITRATE 50 UG/ML
50 INJECTION, SOLUTION INTRAMUSCULAR; INTRAVENOUS ONCE
Status: COMPLETED | OUTPATIENT
Start: 2020-01-01 | End: 2020-01-01

## 2020-01-01 RX ORDER — SODIUM CHLORIDE 9 MG/ML
INJECTION, SOLUTION INTRAVENOUS CONTINUOUS
Status: DISCONTINUED | OUTPATIENT
Start: 2020-01-01 | End: 2020-01-01

## 2020-01-01 RX ORDER — MORPHINE SULFATE 2 MG/ML
2-4 INJECTION, SOLUTION INTRAMUSCULAR; INTRAVENOUS
Status: DISCONTINUED | OUTPATIENT
Start: 2020-01-01 | End: 2020-01-01 | Stop reason: HOSPADM

## 2020-01-01 RX ORDER — LOPERAMIDE HCL 2 MG
2 CAPSULE ORAL 4 TIMES DAILY PRN
Status: DISCONTINUED | OUTPATIENT
Start: 2020-01-01 | End: 2020-01-01 | Stop reason: HOSPADM

## 2020-01-01 RX ORDER — MULTIPLE VITAMINS W/ MINERALS TAB 9MG-400MCG
1 TAB ORAL DAILY
Status: DISCONTINUED | OUTPATIENT
Start: 2020-01-01 | End: 2020-01-01 | Stop reason: HOSPADM

## 2020-01-01 RX ORDER — IODIXANOL 320 MG/ML
100 INJECTION, SOLUTION INTRAVASCULAR ONCE
Status: COMPLETED | OUTPATIENT
Start: 2020-01-01 | End: 2020-01-01

## 2020-01-01 RX ORDER — AZITHROMYCIN 500 MG/5ML
500 INJECTION, POWDER, LYOPHILIZED, FOR SOLUTION INTRAVENOUS EVERY 24 HOURS
Status: DISCONTINUED | OUTPATIENT
Start: 2020-01-01 | End: 2020-01-01

## 2020-01-01 RX ORDER — SIMVASTATIN 80 MG
40 TABLET ORAL AT BEDTIME
COMMUNITY

## 2020-01-01 RX ORDER — MEPERIDINE HYDROCHLORIDE 50 MG/ML
12.5 INJECTION INTRAMUSCULAR; INTRAVENOUS; SUBCUTANEOUS
Status: DISCONTINUED | OUTPATIENT
Start: 2020-01-01 | End: 2020-01-01

## 2020-01-01 RX ORDER — LIDOCAINE HYDROCHLORIDE AND EPINEPHRINE 10; 10 MG/ML; UG/ML
1 INJECTION, SOLUTION INFILTRATION; PERINEURAL ONCE
Status: COMPLETED | OUTPATIENT
Start: 2020-01-01 | End: 2020-01-01

## 2020-01-01 RX ORDER — POLYETHYLENE GLYCOL 3350 17 G/17G
17 POWDER, FOR SOLUTION ORAL DAILY PRN
Status: DISCONTINUED | OUTPATIENT
Start: 2020-01-01 | End: 2020-01-01 | Stop reason: HOSPADM

## 2020-01-01 RX ORDER — OXYCODONE HYDROCHLORIDE 5 MG/1
5 TABLET ORAL ONCE
Status: COMPLETED | OUTPATIENT
Start: 2020-01-01 | End: 2020-01-01

## 2020-01-01 RX ORDER — OXYCODONE AND ACETAMINOPHEN 5; 325 MG/1; MG/1
1 TABLET ORAL ONCE
Status: COMPLETED | OUTPATIENT
Start: 2020-01-01 | End: 2020-01-01

## 2020-01-01 RX ORDER — NICOTINE POLACRILEX 4 MG
15-30 LOZENGE BUCCAL
Status: DISCONTINUED | OUTPATIENT
Start: 2020-01-01 | End: 2020-01-01 | Stop reason: HOSPADM

## 2020-01-01 RX ORDER — NALOXONE HYDROCHLORIDE 0.4 MG/ML
.1-.4 INJECTION, SOLUTION INTRAMUSCULAR; INTRAVENOUS; SUBCUTANEOUS
Status: DISCONTINUED | OUTPATIENT
Start: 2020-01-01 | End: 2020-01-01 | Stop reason: HOSPADM

## 2020-01-01 RX ORDER — ALBUTEROL SULFATE 90 UG/1
2 AEROSOL, METERED RESPIRATORY (INHALATION) EVERY 6 HOURS PRN
Status: DISCONTINUED | OUTPATIENT
Start: 2020-01-01 | End: 2020-01-01 | Stop reason: HOSPADM

## 2020-01-01 RX ORDER — MORPHINE SULFATE 2 MG/ML
2-4 INJECTION, SOLUTION INTRAMUSCULAR; INTRAVENOUS
Status: DISCONTINUED | OUTPATIENT
Start: 2020-01-01 | End: 2020-01-01

## 2020-01-01 RX ORDER — OXYCODONE HYDROCHLORIDE 5 MG/1
5-10 TABLET ORAL
Status: DISCONTINUED | OUTPATIENT
Start: 2020-01-01 | End: 2020-01-01

## 2020-01-01 RX ORDER — MAGNESIUM SULFATE HEPTAHYDRATE 40 MG/ML
4 INJECTION, SOLUTION INTRAVENOUS EVERY 4 HOURS PRN
Status: DISCONTINUED | OUTPATIENT
Start: 2020-01-01 | End: 2020-01-01

## 2020-01-01 RX ORDER — ONDANSETRON 2 MG/ML
4 INJECTION INTRAMUSCULAR; INTRAVENOUS EVERY 30 MIN PRN
Status: DISCONTINUED | OUTPATIENT
Start: 2020-01-01 | End: 2020-01-01

## 2020-01-01 RX ORDER — ATORVASTATIN CALCIUM 10 MG/1
80 TABLET, FILM COATED ORAL AT BEDTIME
Status: DISCONTINUED | OUTPATIENT
Start: 2020-01-01 | End: 2020-01-01

## 2020-01-01 RX ORDER — PROCHLORPERAZINE 25 MG
12.5 SUPPOSITORY, RECTAL RECTAL EVERY 12 HOURS PRN
Status: DISCONTINUED | OUTPATIENT
Start: 2020-01-01 | End: 2020-01-01 | Stop reason: HOSPADM

## 2020-01-01 RX ORDER — AMOXICILLIN 250 MG
2 CAPSULE ORAL 2 TIMES DAILY PRN
Status: DISCONTINUED | OUTPATIENT
Start: 2020-01-01 | End: 2020-01-01 | Stop reason: HOSPADM

## 2020-01-01 RX ORDER — ALBUTEROL SULFATE 90 UG/1
2 AEROSOL, METERED RESPIRATORY (INHALATION) EVERY 6 HOURS PRN
Status: DISCONTINUED | OUTPATIENT
Start: 2020-01-01 | End: 2020-01-01

## 2020-01-01 RX ORDER — ACETAMINOPHEN 325 MG/1
650 TABLET ORAL ONCE
Status: COMPLETED | OUTPATIENT
Start: 2020-01-01 | End: 2020-01-01

## 2020-01-01 RX ORDER — MAGNESIUM CARB/ALUMINUM HYDROX 105-160MG
296 TABLET,CHEWABLE ORAL ONCE
Status: COMPLETED | OUTPATIENT
Start: 2020-01-01 | End: 2020-01-01

## 2020-01-01 RX ORDER — OXYCODONE HYDROCHLORIDE 5 MG/1
5-10 TABLET ORAL
Status: DISCONTINUED | OUTPATIENT
Start: 2020-01-01 | End: 2020-01-01 | Stop reason: HOSPADM

## 2020-01-01 RX ORDER — ASPIRIN 81 MG/1
81 TABLET ORAL
COMMUNITY

## 2020-01-01 RX ORDER — BISACODYL 5 MG/1
TABLET, DELAYED RELEASE ORAL
COMMUNITY
Start: 2020-01-01 | End: 2020-01-01

## 2020-01-01 RX ORDER — CEFTRIAXONE SODIUM 1 G/50ML
1 INJECTION, SOLUTION INTRAVENOUS ONCE
Status: COMPLETED | OUTPATIENT
Start: 2020-01-01 | End: 2020-01-01

## 2020-01-01 RX ORDER — LIDOCAINE 40 MG/G
CREAM TOPICAL
Status: DISCONTINUED | OUTPATIENT
Start: 2020-01-01 | End: 2020-01-01 | Stop reason: HOSPADM

## 2020-01-01 RX ORDER — FERROUS SULFATE 325(65) MG
325 TABLET, DELAYED RELEASE (ENTERIC COATED) ORAL EVERY MORNING
COMMUNITY

## 2020-01-01 RX ORDER — NALOXONE HYDROCHLORIDE 0.4 MG/ML
.1-.4 INJECTION, SOLUTION INTRAMUSCULAR; INTRAVENOUS; SUBCUTANEOUS
Status: DISCONTINUED | OUTPATIENT
Start: 2020-01-01 | End: 2020-01-01

## 2020-01-01 RX ORDER — ONDANSETRON 2 MG/ML
4 INJECTION INTRAMUSCULAR; INTRAVENOUS EVERY 6 HOURS PRN
Status: DISCONTINUED | OUTPATIENT
Start: 2020-01-01 | End: 2020-01-01 | Stop reason: HOSPADM

## 2020-01-01 RX ORDER — ACETAMINOPHEN 325 MG/1
650 TABLET ORAL EVERY 6 HOURS PRN
Qty: 1 TABLET | Refills: 0 | COMMUNITY
Start: 2020-01-01

## 2020-01-01 RX ORDER — NICOTINE POLACRILEX 4 MG
15-30 LOZENGE BUCCAL
Status: DISCONTINUED | OUTPATIENT
Start: 2020-01-01 | End: 2020-01-01

## 2020-01-01 RX ORDER — FERROUS SULFATE 325(65) MG
325 TABLET, DELAYED RELEASE (ENTERIC COATED) ORAL DAILY
Status: DISCONTINUED | OUTPATIENT
Start: 2020-01-01 | End: 2020-01-01 | Stop reason: HOSPADM

## 2020-01-01 RX ORDER — DEXTROSE MONOHYDRATE 25 G/50ML
25-50 INJECTION, SOLUTION INTRAVENOUS
Status: DISCONTINUED | OUTPATIENT
Start: 2020-01-01 | End: 2020-01-01

## 2020-01-01 RX ORDER — ONDANSETRON 4 MG/1
4 TABLET, ORALLY DISINTEGRATING ORAL EVERY 6 HOURS PRN
Status: DISCONTINUED | OUTPATIENT
Start: 2020-01-01 | End: 2020-01-01

## 2020-01-01 RX ORDER — DEXTROSE MONOHYDRATE 25 G/50ML
25-50 INJECTION, SOLUTION INTRAVENOUS
Status: DISCONTINUED | OUTPATIENT
Start: 2020-01-01 | End: 2020-01-01 | Stop reason: HOSPADM

## 2020-01-01 RX ORDER — LISINOPRIL 10 MG/1
10 TABLET ORAL ONCE
Status: COMPLETED | OUTPATIENT
Start: 2020-01-01 | End: 2020-01-01

## 2020-01-01 RX ORDER — MAGNESIUM OXIDE 420 MG/1
420 TABLET ORAL EVERY MORNING
COMMUNITY

## 2020-01-01 RX ORDER — CEFTRIAXONE SODIUM 1 G/50ML
1 INJECTION, SOLUTION INTRAVENOUS EVERY 24 HOURS
Status: DISCONTINUED | OUTPATIENT
Start: 2020-01-01 | End: 2020-01-01 | Stop reason: DRUGHIGH

## 2020-01-01 RX ORDER — CEFTRIAXONE SODIUM 2 G/50ML
2 INJECTION, SOLUTION INTRAVENOUS EVERY 24 HOURS
Status: DISCONTINUED | OUTPATIENT
Start: 2020-01-01 | End: 2020-01-01

## 2020-01-01 RX ORDER — LORAZEPAM 2 MG/ML
0.5 INJECTION INTRAMUSCULAR ONCE
Status: COMPLETED | OUTPATIENT
Start: 2020-01-01 | End: 2020-01-01

## 2020-01-01 RX ORDER — IBUPROFEN 400 MG/1
400 TABLET, FILM COATED ORAL 4 TIMES DAILY PRN
COMMUNITY

## 2020-01-01 RX ORDER — MAGNESIUM SULFATE HEPTAHYDRATE 40 MG/ML
4 INJECTION, SOLUTION INTRAVENOUS EVERY 4 HOURS PRN
Status: DISCONTINUED | OUTPATIENT
Start: 2020-01-01 | End: 2020-01-01 | Stop reason: HOSPADM

## 2020-01-01 RX ORDER — PROCHLORPERAZINE MALEATE 5 MG
5 TABLET ORAL EVERY 6 HOURS PRN
Status: DISCONTINUED | OUTPATIENT
Start: 2020-01-01 | End: 2020-01-01 | Stop reason: HOSPADM

## 2020-01-01 RX ORDER — POTASSIUM CHLORIDE 1500 MG/1
20-40 TABLET, EXTENDED RELEASE ORAL
Status: DISCONTINUED | OUTPATIENT
Start: 2020-01-01 | End: 2020-01-01

## 2020-01-01 RX ORDER — ASPIRIN 81 MG/1
81 TABLET, CHEWABLE ORAL DAILY
Status: DISCONTINUED | OUTPATIENT
Start: 2020-01-01 | End: 2020-01-01 | Stop reason: HOSPADM

## 2020-01-01 RX ORDER — FOLIC ACID 1 MG/1
1 TABLET ORAL DAILY
Status: DISCONTINUED | OUTPATIENT
Start: 2020-01-01 | End: 2020-01-01 | Stop reason: HOSPADM

## 2020-01-01 RX ORDER — INSULIN ASPART 100 [IU]/ML
INJECTION, SUSPENSION SUBCUTANEOUS
COMMUNITY

## 2020-01-01 RX ORDER — SIMVASTATIN 80 MG
40 TABLET ORAL
Status: ON HOLD | COMMUNITY
End: 2020-01-01

## 2020-01-01 RX ORDER — ZIPRASIDONE MESYLATE 20 MG/ML
20 INJECTION, POWDER, LYOPHILIZED, FOR SOLUTION INTRAMUSCULAR EVERY 4 HOURS PRN
Status: DISCONTINUED | OUTPATIENT
Start: 2020-01-01 | End: 2020-01-01 | Stop reason: HOSPADM

## 2020-01-01 RX ORDER — ONDANSETRON 2 MG/ML
4 INJECTION INTRAMUSCULAR; INTRAVENOUS EVERY 6 HOURS PRN
Status: DISCONTINUED | OUTPATIENT
Start: 2020-01-01 | End: 2020-01-01

## 2020-01-01 RX ORDER — LISINOPRIL AND HYDROCHLOROTHIAZIDE 20; 25 MG/1; MG/1
TABLET ORAL
Status: ON HOLD | COMMUNITY
End: 2020-01-01

## 2020-01-01 RX ORDER — LORAZEPAM 2 MG/ML
1-2 INJECTION INTRAMUSCULAR EVERY 30 MIN PRN
Status: DISCONTINUED | OUTPATIENT
Start: 2020-01-01 | End: 2020-01-01

## 2020-01-01 RX ORDER — ALBUTEROL SULFATE 90 UG/1
2 AEROSOL, METERED RESPIRATORY (INHALATION) ONCE
Status: DISCONTINUED | OUTPATIENT
Start: 2020-01-01 | End: 2020-01-01 | Stop reason: DRUGHIGH

## 2020-01-01 RX ORDER — AMOXICILLIN 250 MG
1 CAPSULE ORAL 2 TIMES DAILY PRN
Status: DISCONTINUED | OUTPATIENT
Start: 2020-01-01 | End: 2020-01-01 | Stop reason: DRUGHIGH

## 2020-01-01 RX ORDER — LISINOPRIL AND HYDROCHLOROTHIAZIDE 20; 25 MG/1; MG/1
1 TABLET ORAL EVERY MORNING
COMMUNITY

## 2020-01-01 RX ORDER — LORAZEPAM 0.5 MG/1
0.5 TABLET ORAL ONCE
Status: COMPLETED | OUTPATIENT
Start: 2020-01-01 | End: 2020-01-01

## 2020-01-01 RX ORDER — ONDANSETRON 4 MG/1
4 TABLET, ORALLY DISINTEGRATING ORAL EVERY 30 MIN PRN
Status: DISCONTINUED | OUTPATIENT
Start: 2020-01-01 | End: 2020-01-01

## 2020-01-01 RX ORDER — IBUPROFEN 200 MG
400 TABLET ORAL EVERY 8 HOURS PRN
Status: ON HOLD | COMMUNITY
End: 2020-01-01

## 2020-01-01 RX ADMIN — MULTIPLE VITAMINS W/ MINERALS TAB 1 TABLET: TAB at 08:03

## 2020-01-01 RX ADMIN — METFORMIN HYDROCHLORIDE 1000 MG: 1000 TABLET ORAL at 08:03

## 2020-01-01 RX ADMIN — OXYCODONE HYDROCHLORIDE 10 MG: 5 TABLET ORAL at 20:28

## 2020-01-01 RX ADMIN — FERROUS SULFATE TAB EC 325 MG (65 MG FE EQUIVALENT) 325 MG: 325 (65 FE) TABLET DELAYED RESPONSE at 09:39

## 2020-01-01 RX ADMIN — LISINOPRIL 10 MG: 10 TABLET ORAL at 08:22

## 2020-01-01 RX ADMIN — AMOXICILLIN AND CLAVULANATE POTASSIUM 1 TABLET: 875; 125 TABLET, FILM COATED ORAL at 16:56

## 2020-01-01 RX ADMIN — ACETAMINOPHEN 975 MG: 325 TABLET, FILM COATED ORAL at 05:01

## 2020-01-01 RX ADMIN — AMOXICILLIN AND CLAVULANATE POTASSIUM 1 TABLET: 875; 125 TABLET, FILM COATED ORAL at 08:03

## 2020-01-01 RX ADMIN — ENOXAPARIN SODIUM 40 MG: 40 INJECTION SUBCUTANEOUS at 09:39

## 2020-01-01 RX ADMIN — PROPOFOL 20 MG: 10 INJECTION, EMULSION INTRAVENOUS at 19:42

## 2020-01-01 RX ADMIN — ENOXAPARIN SODIUM 40 MG: 40 INJECTION SUBCUTANEOUS at 10:16

## 2020-01-01 RX ADMIN — MORPHINE SULFATE 2 MG: 2 INJECTION, SOLUTION INTRAMUSCULAR; INTRAVENOUS at 03:12

## 2020-01-01 RX ADMIN — OXYCODONE HYDROCHLORIDE 10 MG: 5 TABLET ORAL at 13:23

## 2020-01-01 RX ADMIN — ASPIRIN 81 MG: 81 TABLET, DELAYED RELEASE ORAL at 07:53

## 2020-01-01 RX ADMIN — INSULIN ASPART 35 UNITS: 100 INJECTION, SUSPENSION SUBCUTANEOUS at 09:29

## 2020-01-01 RX ADMIN — OXYCODONE HYDROCHLORIDE 10 MG: 5 TABLET ORAL at 17:33

## 2020-01-01 RX ADMIN — ASPIRIN 81 MG: 81 TABLET, DELAYED RELEASE ORAL at 08:03

## 2020-01-01 RX ADMIN — ENOXAPARIN SODIUM 40 MG: 40 INJECTION SUBCUTANEOUS at 10:59

## 2020-01-01 RX ADMIN — TAZOBACTAM SODIUM AND PIPERACILLIN SODIUM 4.5 G: 500; 4 INJECTION, SOLUTION INTRAVENOUS at 22:04

## 2020-01-01 RX ADMIN — TAZOBACTAM SODIUM AND PIPERACILLIN SODIUM 4.5 G: 500; 4 INJECTION, SOLUTION INTRAVENOUS at 03:45

## 2020-01-01 RX ADMIN — LORAZEPAM 0.5 MG: 0.5 TABLET ORAL at 11:15

## 2020-01-01 RX ADMIN — PROPOFOL 20 MG: 10 INJECTION, EMULSION INTRAVENOUS at 19:35

## 2020-01-01 RX ADMIN — LORAZEPAM 0.5 MG: 0.5 TABLET ORAL at 07:53

## 2020-01-01 RX ADMIN — TETANUS TOXOID, REDUCED DIPHTHERIA TOXOID AND ACELLULAR PERTUSSIS VACCINE, ADSORBED 0.5 ML: 5; 2.5; 8; 8; 2.5 SUSPENSION INTRAMUSCULAR at 17:58

## 2020-01-01 RX ADMIN — AMOXICILLIN AND CLAVULANATE POTASSIUM 1 TABLET: 875; 125 TABLET, FILM COATED ORAL at 10:56

## 2020-01-01 RX ADMIN — ATORVASTATIN CALCIUM 40 MG: 40 TABLET ORAL at 21:39

## 2020-01-01 RX ADMIN — ZIPRASIDONE MESYLATE 20 MG: 20 INJECTION, POWDER, LYOPHILIZED, FOR SOLUTION INTRAMUSCULAR at 00:34

## 2020-01-01 RX ADMIN — MULTIPLE VITAMINS W/ MINERALS TAB 1 TABLET: TAB at 14:55

## 2020-01-01 RX ADMIN — METFORMIN HYDROCHLORIDE 1000 MG: 1000 TABLET ORAL at 23:39

## 2020-01-01 RX ADMIN — FENTANYL CITRATE 50 MCG: 50 INJECTION, SOLUTION INTRAMUSCULAR; INTRAVENOUS at 20:10

## 2020-01-01 RX ADMIN — LIDOCAINE HYDROCHLORIDE,EPINEPHRINE BITARTRATE 1 ML: 10; .01 INJECTION, SOLUTION INFILTRATION; PERINEURAL at 22:20

## 2020-01-01 RX ADMIN — AMOXICILLIN AND CLAVULANATE POTASSIUM 1 TABLET: 875; 125 TABLET, FILM COATED ORAL at 17:19

## 2020-01-01 RX ADMIN — THIAMINE HCL TAB 100 MG 100 MG: 100 TAB at 10:22

## 2020-01-01 RX ADMIN — SODIUM CHLORIDE, POTASSIUM CHLORIDE, SODIUM LACTATE AND CALCIUM CHLORIDE: 600; 310; 30; 20 INJECTION, SOLUTION INTRAVENOUS at 00:10

## 2020-01-01 RX ADMIN — AZITHROMYCIN 500 MG: 500 INJECTION, POWDER, LYOPHILIZED, FOR SOLUTION INTRAVENOUS at 10:18

## 2020-01-01 RX ADMIN — PROPOFOL 40 MG: 10 INJECTION, EMULSION INTRAVENOUS at 19:44

## 2020-01-01 RX ADMIN — FENTANYL CITRATE 50 MCG: 50 INJECTION, SOLUTION INTRAMUSCULAR; INTRAVENOUS at 21:47

## 2020-01-01 RX ADMIN — ACETAMINOPHEN 975 MG: 325 TABLET, FILM COATED ORAL at 23:39

## 2020-01-01 RX ADMIN — FOLIC ACID 1 MG: 1 TABLET ORAL at 09:39

## 2020-01-01 RX ADMIN — FERROUS SULFATE TAB EC 325 MG (65 MG FE EQUIVALENT) 325 MG: 325 (65 FE) TABLET DELAYED RESPONSE at 09:55

## 2020-01-01 RX ADMIN — MORPHINE SULFATE 2 MG: 2 INJECTION, SOLUTION INTRAMUSCULAR; INTRAVENOUS at 10:17

## 2020-01-01 RX ADMIN — OXYCODONE HYDROCHLORIDE 5 MG: 5 TABLET ORAL at 04:09

## 2020-01-01 RX ADMIN — OXYCODONE HYDROCHLORIDE 10 MG: 5 TABLET ORAL at 23:16

## 2020-01-01 RX ADMIN — INSULIN ASPART 30 UNITS: 100 INJECTION, SUSPENSION SUBCUTANEOUS at 08:05

## 2020-01-01 RX ADMIN — OXYCODONE HYDROCHLORIDE 10 MG: 5 TABLET ORAL at 15:46

## 2020-01-01 RX ADMIN — LOPERAMIDE HYDROCHLORIDE 2 MG: 2 CAPSULE ORAL at 21:01

## 2020-01-01 RX ADMIN — DOCUSATE SODIUM 50 MG AND SENNOSIDES 8.6 MG 2 TABLET: 8.6; 5 TABLET, FILM COATED ORAL at 07:53

## 2020-01-01 RX ADMIN — LORAZEPAM 0.5 MG: 2 INJECTION, SOLUTION INTRAMUSCULAR; INTRAVENOUS at 02:08

## 2020-01-01 RX ADMIN — INSULIN ASPART 30 UNITS: 100 INJECTION, SUSPENSION SUBCUTANEOUS at 07:43

## 2020-01-01 RX ADMIN — INSULIN ASPART 2 UNITS: 100 INJECTION, SOLUTION INTRAVENOUS; SUBCUTANEOUS at 17:24

## 2020-01-01 RX ADMIN — ENOXAPARIN SODIUM 40 MG: 40 INJECTION SUBCUTANEOUS at 22:04

## 2020-01-01 RX ADMIN — IODIXANOL 100 ML: 320 INJECTION, SOLUTION INTRAVASCULAR at 20:04

## 2020-01-01 RX ADMIN — OXYCODONE HYDROCHLORIDE 5 MG: 5 TABLET ORAL at 08:07

## 2020-01-01 RX ADMIN — MULTIPLE VITAMINS W/ MINERALS TAB 1 TABLET: TAB at 14:49

## 2020-01-01 RX ADMIN — SODIUM CHLORIDE 0.2 MCG/KG/HR: 9 INJECTION, SOLUTION INTRAVENOUS at 13:51

## 2020-01-01 RX ADMIN — AMOXICILLIN AND CLAVULANATE POTASSIUM 1 TABLET: 875; 125 TABLET, FILM COATED ORAL at 13:42

## 2020-01-01 RX ADMIN — INSULIN ASPART 1 UNITS: 100 INJECTION, SOLUTION INTRAVENOUS; SUBCUTANEOUS at 11:52

## 2020-01-01 RX ADMIN — HYDROCHLOROTHIAZIDE: 25 TABLET ORAL at 08:04

## 2020-01-01 RX ADMIN — ASPIRIN 81 MG: 81 TABLET, DELAYED RELEASE ORAL at 10:21

## 2020-01-01 RX ADMIN — ATORVASTATIN CALCIUM 40 MG: 40 TABLET ORAL at 21:01

## 2020-01-01 RX ADMIN — SODIUM CHLORIDE: 9 INJECTION, SOLUTION INTRAVENOUS at 17:22

## 2020-01-01 RX ADMIN — AZITHROMYCIN 500 MG: 500 INJECTION, POWDER, LYOPHILIZED, FOR SOLUTION INTRAVENOUS at 10:58

## 2020-01-01 RX ADMIN — PROPOFOL 20 MG: 10 INJECTION, EMULSION INTRAVENOUS at 19:38

## 2020-01-01 RX ADMIN — LISINOPRIL: 20 TABLET ORAL at 09:38

## 2020-01-01 RX ADMIN — AMOXICILLIN AND CLAVULANATE POTASSIUM 1 TABLET: 875; 125 TABLET, FILM COATED ORAL at 00:05

## 2020-01-01 RX ADMIN — TAZOBACTAM SODIUM AND PIPERACILLIN SODIUM 4.5 G: 500; 4 INJECTION, SOLUTION INTRAVENOUS at 09:55

## 2020-01-01 RX ADMIN — AMOXICILLIN AND CLAVULANATE POTASSIUM 1 TABLET: 875; 125 TABLET, FILM COATED ORAL at 07:42

## 2020-01-01 RX ADMIN — OXYCODONE HYDROCHLORIDE 10 MG: 5 TABLET ORAL at 13:31

## 2020-01-01 RX ADMIN — INSULIN ASPART 1 UNITS: 100 INJECTION, SOLUTION INTRAVENOUS; SUBCUTANEOUS at 08:08

## 2020-01-01 RX ADMIN — AMOXICILLIN AND CLAVULANATE POTASSIUM 1 TABLET: 875; 125 TABLET, FILM COATED ORAL at 22:13

## 2020-01-01 RX ADMIN — OXYCODONE HYDROCHLORIDE 10 MG: 5 TABLET ORAL at 02:48

## 2020-01-01 RX ADMIN — MULTIPLE VITAMINS W/ MINERALS TAB 1 TABLET: TAB at 10:17

## 2020-01-01 RX ADMIN — MULTIPLE VITAMINS W/ MINERALS TAB 1 TABLET: TAB at 07:42

## 2020-01-01 RX ADMIN — HYDROCHLOROTHIAZIDE: 25 TABLET ORAL at 14:55

## 2020-01-01 RX ADMIN — POLYETHYLENE GLYCOL 3350 17 G: 17 POWDER, FOR SOLUTION ORAL at 07:53

## 2020-01-01 RX ADMIN — IODIXANOL 100 ML: 320 INJECTION, SOLUTION INTRAVASCULAR at 19:33

## 2020-01-01 RX ADMIN — OXYCODONE HYDROCHLORIDE AND ACETAMINOPHEN 1 TABLET: 5; 325 TABLET ORAL at 13:42

## 2020-01-01 RX ADMIN — FENTANYL CITRATE 50 MCG: 50 INJECTION, SOLUTION INTRAMUSCULAR; INTRAVENOUS at 17:57

## 2020-01-01 RX ADMIN — INSULIN ASPART 35 UNITS: 100 INJECTION, SUSPENSION SUBCUTANEOUS at 08:09

## 2020-01-01 RX ADMIN — TAZOBACTAM SODIUM AND PIPERACILLIN SODIUM 4.5 G: 500; 4 INJECTION, SOLUTION INTRAVENOUS at 17:02

## 2020-01-01 RX ADMIN — FERROUS SULFATE TAB EC 325 MG (65 MG FE EQUIVALENT) 325 MG: 325 (65 FE) TABLET DELAYED RESPONSE at 10:17

## 2020-01-01 RX ADMIN — PROPOFOL 20 MG: 10 INJECTION, EMULSION INTRAVENOUS at 19:33

## 2020-01-01 RX ADMIN — THIAMINE HCL TAB 100 MG 100 MG: 100 TAB at 14:49

## 2020-01-01 RX ADMIN — LISINOPRIL: 20 TABLET ORAL at 09:56

## 2020-01-01 RX ADMIN — ENOXAPARIN SODIUM 40 MG: 40 INJECTION SUBCUTANEOUS at 22:13

## 2020-01-01 RX ADMIN — FOLIC ACID 1 MG: 1 TABLET ORAL at 09:56

## 2020-01-01 RX ADMIN — SODIUM CHLORIDE, POTASSIUM CHLORIDE, SODIUM LACTATE AND CALCIUM CHLORIDE 1000 ML: 600; 310; 30; 20 INJECTION, SOLUTION INTRAVENOUS at 18:01

## 2020-01-01 RX ADMIN — ASPIRIN 81 MG: 81 TABLET, DELAYED RELEASE ORAL at 08:07

## 2020-01-01 RX ADMIN — LORAZEPAM 0.5 MG: 0.5 TABLET ORAL at 00:32

## 2020-01-01 RX ADMIN — OXYCODONE HYDROCHLORIDE 10 MG: 5 TABLET ORAL at 12:08

## 2020-01-01 RX ADMIN — PROPOFOL 20 MG: 10 INJECTION, EMULSION INTRAVENOUS at 19:40

## 2020-01-01 RX ADMIN — Medication 400 MG: at 08:22

## 2020-01-01 RX ADMIN — INSULIN ASPART 2 UNITS: 100 INJECTION, SOLUTION INTRAVENOUS; SUBCUTANEOUS at 11:45

## 2020-01-01 RX ADMIN — PROPOFOL 50 MG: 10 INJECTION, EMULSION INTRAVENOUS at 19:28

## 2020-01-01 RX ADMIN — ACETAMINOPHEN 975 MG: 325 TABLET, FILM COATED ORAL at 11:40

## 2020-01-01 RX ADMIN — MAGNESIUM CITRATE 296 ML: 1.75 LIQUID ORAL at 15:46

## 2020-01-01 RX ADMIN — ACETAMINOPHEN 975 MG: 325 TABLET ORAL at 23:05

## 2020-01-01 RX ADMIN — INSULIN ASPART 1 UNITS: 100 INJECTION, SOLUTION INTRAVENOUS; SUBCUTANEOUS at 17:58

## 2020-01-01 RX ADMIN — ASPIRIN 81 MG: 81 TABLET, DELAYED RELEASE ORAL at 08:33

## 2020-01-01 RX ADMIN — TAZOBACTAM SODIUM AND PIPERACILLIN SODIUM 4.5 G: 500; 4 INJECTION, SOLUTION INTRAVENOUS at 10:17

## 2020-01-01 RX ADMIN — FERROUS SULFATE TAB EC 325 MG (65 MG FE EQUIVALENT) 325 MG: 325 (65 FE) TABLET DELAYED RESPONSE at 10:59

## 2020-01-01 RX ADMIN — AMOXICILLIN AND CLAVULANATE POTASSIUM 1 TABLET: 875; 125 TABLET, FILM COATED ORAL at 08:22

## 2020-01-01 RX ADMIN — ACETAMINOPHEN 975 MG: 325 TABLET, FILM COATED ORAL at 09:11

## 2020-01-01 RX ADMIN — TAZOBACTAM SODIUM AND PIPERACILLIN SODIUM 4.5 G: 500; 4 INJECTION, SOLUTION INTRAVENOUS at 10:05

## 2020-01-01 RX ADMIN — LORAZEPAM 0.5 MG: 0.5 TABLET ORAL at 23:16

## 2020-01-01 RX ADMIN — ENOXAPARIN SODIUM 40 MG: 40 INJECTION SUBCUTANEOUS at 21:01

## 2020-01-01 RX ADMIN — INSULIN ASPART 1 UNITS: 100 INJECTION, SOLUTION INTRAVENOUS; SUBCUTANEOUS at 17:25

## 2020-01-01 RX ADMIN — THIAMINE HCL TAB 100 MG 100 MG: 100 TAB at 09:39

## 2020-01-01 RX ADMIN — FENTANYL CITRATE 50 MCG: 50 INJECTION, SOLUTION INTRAMUSCULAR; INTRAVENOUS at 19:08

## 2020-01-01 RX ADMIN — MULTIPLE VITAMINS W/ MINERALS TAB 1 TABLET: TAB at 09:56

## 2020-01-01 RX ADMIN — LORAZEPAM 0.5 MG: 0.5 TABLET ORAL at 12:08

## 2020-01-01 RX ADMIN — INSULIN ASPART 2 UNITS: 100 INJECTION, SOLUTION INTRAVENOUS; SUBCUTANEOUS at 08:40

## 2020-01-01 RX ADMIN — ATORVASTATIN CALCIUM 40 MG: 40 TABLET ORAL at 22:13

## 2020-01-01 RX ADMIN — OXYCODONE HYDROCHLORIDE 10 MG: 5 TABLET ORAL at 02:05

## 2020-01-01 RX ADMIN — SODIUM CHLORIDE, POTASSIUM CHLORIDE, SODIUM LACTATE AND CALCIUM CHLORIDE: 600; 310; 30; 20 INJECTION, SOLUTION INTRAVENOUS at 19:20

## 2020-01-01 RX ADMIN — TAZOBACTAM SODIUM AND PIPERACILLIN SODIUM 4.5 G: 500; 4 INJECTION, SOLUTION INTRAVENOUS at 21:01

## 2020-01-01 RX ADMIN — FOLIC ACID 1 MG: 1 TABLET ORAL at 10:17

## 2020-01-01 RX ADMIN — SODIUM CHLORIDE 500 ML: 9 INJECTION, SOLUTION INTRAVENOUS at 19:09

## 2020-01-01 RX ADMIN — ACETAMINOPHEN 650 MG: 325 TABLET, FILM COATED ORAL at 13:42

## 2020-01-01 RX ADMIN — OXYCODONE HYDROCHLORIDE 10 MG: 5 TABLET ORAL at 01:27

## 2020-01-01 RX ADMIN — OXYCODONE HYDROCHLORIDE 10 MG: 5 TABLET ORAL at 08:25

## 2020-01-01 RX ADMIN — FERROUS SULFATE TAB EC 325 MG (65 MG FE EQUIVALENT) 325 MG: 325 (65 FE) TABLET DELAYED RESPONSE at 10:21

## 2020-01-01 RX ADMIN — TAZOBACTAM SODIUM AND PIPERACILLIN SODIUM 4.5 G: 500; 4 INJECTION, SOLUTION INTRAVENOUS at 15:50

## 2020-01-01 RX ADMIN — SODIUM CHLORIDE: 9 INJECTION, SOLUTION INTRAVENOUS at 01:11

## 2020-01-01 RX ADMIN — INSULIN ASPART 1 UNITS: 100 INJECTION, SOLUTION INTRAVENOUS; SUBCUTANEOUS at 13:52

## 2020-01-01 RX ADMIN — METFORMIN HYDROCHLORIDE 1000 MG: 1000 TABLET ORAL at 16:57

## 2020-01-01 RX ADMIN — PROPOFOL 30 MG: 10 INJECTION, EMULSION INTRAVENOUS at 19:31

## 2020-01-01 RX ADMIN — AMOXICILLIN AND CLAVULANATE POTASSIUM 1 TABLET: 875; 125 TABLET, FILM COATED ORAL at 23:39

## 2020-01-01 RX ADMIN — SODIUM CHLORIDE: 9 INJECTION, SOLUTION INTRAVENOUS at 22:04

## 2020-01-01 RX ADMIN — MORPHINE SULFATE 2 MG: 2 INJECTION, SOLUTION INTRAMUSCULAR; INTRAVENOUS at 23:43

## 2020-01-01 RX ADMIN — MULTIPLE VITAMINS W/ MINERALS TAB 1 TABLET: TAB at 09:39

## 2020-01-01 RX ADMIN — MORPHINE SULFATE 2 MG: 2 INJECTION, SOLUTION INTRAMUSCULAR; INTRAVENOUS at 15:50

## 2020-01-01 RX ADMIN — ASPIRIN 81 MG: 81 TABLET, DELAYED RELEASE ORAL at 07:44

## 2020-01-01 RX ADMIN — THIAMINE HCL TAB 100 MG 100 MG: 100 TAB at 10:17

## 2020-01-01 RX ADMIN — INSULIN ASPART 1 UNITS: 100 INJECTION, SOLUTION INTRAVENOUS; SUBCUTANEOUS at 08:06

## 2020-01-01 RX ADMIN — CEFTRIAXONE SODIUM 2 G: 2 INJECTION, SOLUTION INTRAVENOUS at 09:24

## 2020-01-01 RX ADMIN — OXYCODONE HYDROCHLORIDE 10 MG: 5 TABLET ORAL at 17:15

## 2020-01-01 RX ADMIN — IODIXANOL 100 ML: 320 INJECTION, SOLUTION INTRAVASCULAR at 19:32

## 2020-01-01 RX ADMIN — PROPOFOL 30 MG: 10 INJECTION, EMULSION INTRAVENOUS at 19:46

## 2020-01-01 RX ADMIN — INSULIN ASPART 35 UNITS: 100 INJECTION, SUSPENSION SUBCUTANEOUS at 08:05

## 2020-01-01 RX ADMIN — ACETAMINOPHEN 975 MG: 325 TABLET, FILM COATED ORAL at 06:07

## 2020-01-01 RX ADMIN — CEFTRIAXONE SODIUM 1 G: 1 INJECTION, SOLUTION INTRAVENOUS at 21:44

## 2020-01-01 RX ADMIN — METFORMIN HYDROCHLORIDE 1000 MG: 1000 TABLET ORAL at 07:42

## 2020-01-01 RX ADMIN — OXYCODONE HYDROCHLORIDE 10 MG: 5 TABLET ORAL at 21:21

## 2020-01-01 RX ADMIN — AMOXICILLIN AND CLAVULANATE POTASSIUM 1 TABLET: 875; 125 TABLET, FILM COATED ORAL at 11:28

## 2020-01-01 RX ADMIN — ASPIRIN 81 MG: 81 TABLET, DELAYED RELEASE ORAL at 08:42

## 2020-01-01 RX ADMIN — INSULIN ASPART 35 UNITS: 100 INJECTION, SUSPENSION SUBCUTANEOUS at 08:42

## 2020-01-01 RX ADMIN — OXYCODONE HYDROCHLORIDE 10 MG: 5 TABLET ORAL at 05:18

## 2020-01-01 RX ADMIN — MORPHINE SULFATE 2 MG: 2 INJECTION, SOLUTION INTRAMUSCULAR; INTRAVENOUS at 14:48

## 2020-01-01 RX ADMIN — OXYCODONE HYDROCHLORIDE 10 MG: 5 TABLET ORAL at 07:53

## 2020-01-01 RX ADMIN — ASPIRIN 81 MG 81 MG: 81 TABLET ORAL at 08:22

## 2020-01-01 RX ADMIN — FOLIC ACID 1 MG: 1 TABLET ORAL at 14:49

## 2020-01-01 RX ADMIN — LISINOPRIL: 20 TABLET ORAL at 11:15

## 2020-01-01 RX ADMIN — ACETAMINOPHEN 975 MG: 325 TABLET, FILM COATED ORAL at 00:58

## 2020-01-01 RX ADMIN — INSULIN ASPART 30 UNITS: 100 INJECTION, SUSPENSION SUBCUTANEOUS at 08:23

## 2020-01-01 RX ADMIN — TAZOBACTAM SODIUM AND PIPERACILLIN SODIUM 4.5 G: 500; 4 INJECTION, SOLUTION INTRAVENOUS at 03:32

## 2020-01-01 RX ADMIN — PROPOFOL 20 MG: 10 INJECTION, EMULSION INTRAVENOUS at 19:37

## 2020-01-01 RX ADMIN — HYDROCHLOROTHIAZIDE: 25 TABLET ORAL at 07:43

## 2020-01-01 RX ADMIN — OXYCODONE HYDROCHLORIDE 10 MG: 5 TABLET ORAL at 04:58

## 2020-01-01 ASSESSMENT — ENCOUNTER SYMPTOMS
FEVER: 0
WEAKNESS: 1
ABDOMINAL PAIN: 0
CHILLS: 0
WOUND: 0
CHEST TIGHTNESS: 0
BRUISES/BLEEDS EASILY: 0
CONFUSION: 0
BACK PAIN: 0
SHORTNESS OF BREATH: 1
ADENOPATHY: 0
HALLUCINATIONS: 1
TREMORS: 0
CHILLS: 0
COUGH: 1
ABDOMINAL PAIN: 1
ABDOMINAL PAIN: 1
FEVER: 0
SHORTNESS OF BREATH: 0
BACK PAIN: 0
HEMATURIA: 0
CHILLS: 1
FEVER: 0
CHEST TIGHTNESS: 0

## 2020-01-01 ASSESSMENT — ACTIVITIES OF DAILY LIVING (ADL)
ADLS_ACUITY_SCORE: 20
ADLS_ACUITY_SCORE: 16
ADLS_ACUITY_SCORE: 19
ADLS_ACUITY_SCORE: 20
ADLS_ACUITY_SCORE: 19
ADLS_ACUITY_SCORE: 19
ADLS_ACUITY_SCORE: 21
ADLS_ACUITY_SCORE: 20
ADLS_ACUITY_SCORE: 19
ADLS_ACUITY_SCORE: 18
ADLS_ACUITY_SCORE: 18
ADLS_ACUITY_SCORE: 16
ADLS_ACUITY_SCORE: 21
ADLS_ACUITY_SCORE: 18
ADLS_ACUITY_SCORE: 21
ADLS_ACUITY_SCORE: 17
ADLS_ACUITY_SCORE: 20
ADLS_ACUITY_SCORE: 20
ADLS_ACUITY_SCORE: 19
ADLS_ACUITY_SCORE: 21
ADLS_ACUITY_SCORE: 18
ADLS_ACUITY_SCORE: 18
ADLS_ACUITY_SCORE: 15
ADLS_ACUITY_SCORE: 18
ADLS_ACUITY_SCORE: 20

## 2020-01-01 ASSESSMENT — MIFFLIN-ST. JEOR
SCORE: 2270.75
SCORE: 2390.95
SCORE: 2445.88

## 2020-01-01 ASSESSMENT — LIFESTYLE VARIABLES: TOBACCO_USE: 1

## 2020-10-19 PROBLEM — J18.9 COMMUNITY ACQUIRED PNEUMONIA, UNSPECIFIED LATERALITY: Status: ACTIVE | Noted: 2020-01-01
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
## 2020-10-19 PROBLEM — T14.90XA TRAUMA: Status: ACTIVE | Noted: 2020-01-01

## 2020-10-19 NOTE — PROGRESS NOTES
Patient is unable to completed CT scan at this time.  Once patient was supine flat, patient was unable to breath and his face turned purple.  Provider was notified.  Patient will need to be medically sedated for CT scan.  Patient brought back to the room.  Mili Avelar RN on 10/19/2020 at 6:38 PM

## 2020-10-19 NOTE — ED NOTES
Patient requested that I call Darek Gutierrez, his neighbor.  Attempted to contact Darek at [phone].  Left message requesting a call back.    Mili Avelar RN on 10/19/2020 at 6:46 PM

## 2020-10-19 NOTE — PROGRESS NOTES
Facile bleeding cleaned at this time.  Patient has a laceration in the upper corner of his right eye.  Will notify provider.   Mili Avelar RN on 10/19/2020 at 6:39 PM

## 2020-10-19 NOTE — PROGRESS NOTES
IV Contrast- Discharge Instructions After Your CT Scan      The IV contrast you received today will be filtered from your bloodstream by your kidneys during the next 24 hours and pass from the body in urine.  You will not be aware of this process and your urine will not change in color.  To help this process you should drink at least 4 additional glasses of water or juice today.  This reduces stress on your kidneys.    Most contrast reactions are immediate.  Should you develop symptoms of concern after discharge, contact the department at the number below.  After hours you should contact your personal physician.  If you develop breathing distress or wheezing, call 911.      1.  Has the patient had a previous reaction to IV contrast? no    2.  Does the patient have kidney disease? no    3.  Is the patient on dialysis? No      If YES to any of these questions, exam will be reviewed with a Radiologist before administering contrast.  Trauma called. Unable to review labs. visipaque used  Fidelia Ferguson on 10/19/2020 at 5:49 PM
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
## 2020-10-19 NOTE — ANESTHESIA PREPROCEDURE EVALUATION
Anesthesia Pre-Procedure Evaluation    Patient: Alberto Khan   MRN: [id number] : 1940          Preoperative Diagnosis: * No pre-op diagnosis entered *    * No procedures listed *    No past medical history on file.  History reviewed. No pertinent surgical history.    Anesthesia Evaluation     . Pt has had prior anesthetic.     No history of anesthetic complications          ROS/MED HX    ENT/Pulmonary:     (+)CHLOÉ risk factors hypertension, obese, tobacco use, Past use , . .    Neurologic:  - neg neurologic ROS     Cardiovascular:     (+) hypertension----. : . . . pacemaker :. .       METS/Exercise Tolerance:  >4 METS   Hematologic:  - neg hematologic  ROS       Musculoskeletal:   (+) arthritis,  -       GI/Hepatic:  - neg GI/hepatic ROS       Renal/Genitourinary:  - ROS Renal section negative       Endo:     (+) type II DM Obesity, .      Psychiatric:  - neg psychiatric ROS       Infectious Disease:  - neg infectious disease ROS       Malignancy:      - no malignancy   Other:    - neg other ROS                      Physical Exam  Normal systems: cardiovascular    Airway   Mallampati: II  TM distance: >3 FB  Neck ROM: limited    Dental   Comment: Fair condition    Cardiovascular   Rhythm and rate: regular and normal      Pulmonary (+) decreased breath sounds               Lab Results   Component Value Date    WBC 15.0 (H) 10/19/2020    HGB 11.5 (L) 10/19/2020    HCT 37.0 (L) 10/19/2020     10/19/2020     (L) 10/19/2020    POTASSIUM 4.2 10/19/2020    CHLORIDE 95 (L) 10/19/2020    CO2 30 10/19/2020    BUN 19 10/19/2020    CR 1.12 10/19/2020     (H) 10/19/2020    RUDDY 9.1 10/19/2020    ALBUMIN 4.0 10/19/2020    PROTTOTAL 7.0 10/19/2020    ALT 42 10/19/2020    AST 47 (H) 10/19/2020    ALKPHOS 66 10/19/2020    BILITOTAL 0.3 10/19/2020    LIPASE 240 (H) 10/19/2020    PTT 27 10/19/2020    INR 0.91 10/19/2020       Preop Vitals  BP Readings from Last 3 Encounters:   10/19/20 (!) 152/135    Pulse  "Readings from Last 3 Encounters:   10/19/20 101      Resp Readings from Last 3 Encounters:   10/19/20 26    SpO2 Readings from Last 3 Encounters:   10/19/20 96%      Temp Readings from Last 1 Encounters:   10/19/20 98.6  F (37  C) (Tympanic)    Ht Readings from Last 1 Encounters:   10/19/20 1.854 m (6' 1\")      Wt Readings from Last 1 Encounters:   10/19/20 149.7 kg (330 lb)    Estimated body mass index is 43.54 kg/m  as calculated from the following:    Height as of this encounter: 1.854 m (6' 1\").    Weight as of this encounter: 149.7 kg (330 lb).       Anesthesia Plan      History & Physical Review      ASA Status:  3 emergent.    NPO Status:  > 6 hours    Plan for MAC            Postoperative Care      Consents  Anesthetic plan, risks, benefits and alternatives discussed with:  Patient.  Use of blood products discussed: No .   .                 VERO Adams CRNA  "
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
## 2020-10-20 PROBLEM — E11.9 T2DM (TYPE 2 DIABETES MELLITUS) (H): Status: ACTIVE | Noted: 2020-01-01

## 2020-10-20 PROBLEM — S01.81XA FACIAL LACERATION, INITIAL ENCOUNTER: Status: ACTIVE | Noted: 2020-01-01

## 2020-10-20 NOTE — PROGRESS NOTES
"Epic alerted writer for lactic acid to be drawn. Lab to room to draw lactic acid, pt refused. Pt educated on lactic acid and lab values. Pt states he just \"wants to sleep rest of night.\" VSS  Will continue to monitor.   "

## 2020-10-20 NOTE — PLAN OF CARE
"BP (!) 146/66   Pulse 98   Temp 98.1  F (36.7  C) (Tympanic)   Resp 22   Ht 1.854 m (6' 1\")   Wt (!) 167.2 kg (368 lb 9.8 oz)   SpO2 98%   BMI 48.63 kg/m    Pt is AxO x4, VSS, afebrile. Pt denies any pain at the time of assessment. LS diminished throughout, HR reg, BS active. Pt reports SOB with activity and at rest. Pt on 5 LPM NC O2 sat 96%, RR 22, abd muscle use noted. Pt sitting upright in bed. Pt doesn't tolerate supine position. Pt reports he normally doesn't lie flat due to feeling short of breath.  Pt weight shifts independently in bed. Pt has bruising to face. Stitches to right eye are intact. Will continue to monitor.   "
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
## 2020-10-20 NOTE — PROGRESS NOTES
"Pt reports pain 8/10 on left side of rib cage, radiating to abd. Pt states this is the same area he was having pain in ER. PRN bishop given with relief for about \" two hours\" per pt. Pt states he is worried about [facility], and not having any help for these appts. Order placed for social work consult. Will continue to monitor.   "

## 2020-10-20 NOTE — ANESTHESIA CARE TRANSFER NOTE
Patient: Alberto Khan    * No procedures listed *    Diagnosis: * No pre-op diagnosis entered *  Diagnosis Additional Information: No value filed.    Anesthesia Type:   MAC     Note:  Airway :Face Mask  Patient transferred to:Emergency Department  Handoff Report: Identifed the Patient, Identified the Reponsible Provider, Reviewed the pertinent medical history, Discussed the surgical course, Reviewed Intra-OP anesthesia mangement and issues during anesthesia, Set expectations for post-procedure period and Allowed opportunity for questions and acknowledgement of understanding      Vitals: (Last set prior to Anesthesia Care Transfer)    CRNA VITALS  10/19/2020 1920 - 10/19/2020 2020      10/19/2020             Temp:  96.8  F (36  C)    Resp Rate (observed):  24    EKG:  V Paced                Electronically Signed By: VERO Adams CRNA  October 19, 2020  8:23 PM

## 2020-10-20 NOTE — ANESTHESIA POSTPROCEDURE EVALUATION
Patient: Alberto Khan    * No procedures listed *    Diagnosis:* No pre-op diagnosis entered *  Diagnosis Additional Information: No value filed.    Anesthesia Type:  MAC    Note:  Anesthesia Post Evaluation    Patient location during evaluation: ED  Patient participation: Able to fully participate in evaluation  Level of consciousness: awake and alert  Pain management: adequate  Airway patency: patent  Cardiovascular status: acceptable  Respiratory status: acceptable  Hydration status: acceptable  PONV: none     Anesthetic complications: None          Last vitals:  Vitals:    10/19/20 1740   BP: (!) 152/135   Pulse: 101   Resp: 26   Temp: 98.6  F (37  C)   SpO2: 96%         Electronically Signed By: VERO Adams CRNA  October 19, 2020  8:24 PM

## 2020-10-20 NOTE — PROGRESS NOTES
"Patient complains of bilat upper abominal/rib pain, gave prn oxycodone. Bruising to both pectoralis major muscles and bruising near left 5th intercostal space. Right eye is swollen and bruised as well. Patient desat to high 60s when O2 fell off but quickly pedro to 90s with 5LPM nasal cannula. Will continue to monitor.    /64 (BP Location: Right arm)   Pulse (P) 95   Temp (P) 98.8  F (37.1  C) (Tympanic)   Resp (P) 24   Ht 1.854 m (6' 1\")   Wt (!) 167.2 kg (368 lb 9.8 oz)   SpO2 (P) 92%   BMI 48.63 kg/m      "
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
## 2020-10-20 NOTE — PHARMACY-ADMISSION MEDICATION HISTORY
Pharmacy -- Admission Medication Reconciliation    Prior to admission (PTA) medications were reviewed and the patient's PTA medication list was updated.    Sources Consulted: patient, [facility] [place], [facility] [place], Care Everywhere    The reliability of this Medication Reconciliation is: Reliability: Reliable    The following significant changes were made:  ALL MEDICATIONS WERE ADDED TO THE PATIENT'S MED LIST    Aspirin ADDED  Ferrous sulfate ADDED  Novolog 70/30 ADDED  Magnesium ADDED  Metformin ADDED  Ibuprofen ADDED  Lisinopril-hydrochlorothiazide ADDED  MVI ADDED  Simvastatin ADDED  Triamcinolone cream ADDED    In addition, the patient's allergies were reviewed with the patient and updated as follows:   Allergies: Gabapentin and No known drug allergies   Note: patient requested I keep gabapentin on his allergy list even though he cannot recall ever having a reaction to it.    The pharmacist has reviewed with the patient that all personal medications should be removed from the building or locked in the belongings safe.  Patient shall only take medications ordered by the physician and administered by the nursing staff.       Medication barriers identified: lives home alone, manages own medications; slight confusion but knows his insulin regimen and most other meds; checks blood sugars twice a day; uses 3 weekly pill boxes; states he has no transportation and does not know anyone who could help him (social work involved in care)   Medication adherence concerns: patient states compliance, uses the [facility]   Understanding of emergency medications: not discussed - states his blood sugars never go low - lowest reading in the past two weeks reported as 135    zAul Rivera [facility], 10/20/2020,  11:19 AM

## 2020-10-20 NOTE — PROGRESS NOTES
"[facility] [place] ADMISSION NOTE    Patient admitted to room 352 at approximately 2342 via wheel chair from emergency room. Patient was accompanied by other:staff.     Verbal SBAR report received from Daniel prior to patient arrival.     Patient ambulated to bed with stand-by assist. Patient alert and oriented X 3. Pain is controlled with current analgesics.  Medication(s) being used: fentayl. 0-10 Pain Scale: 9. Admission vital signs: Blood pressure (!) 146/66, pulse 98, temperature 98.1  F (36.7  C), temperature source Tympanic, resp. rate 22, height 1.854 m (6' 1\"), weight (!) 167.2 kg (368 lb 9.8 oz), SpO2 98 %. Patient was oriented to plan of care, call light, bed controls, tv, telephone, bathroom and visiting hours.     Risk Assessment    The following safety risks were identified during admission: fall. Yellow risk band applied: YES.           Janette Mcrae    "

## 2020-10-20 NOTE — PROGRESS NOTES
Patient injected with 100 mL of visipaque 320.  Contrast was injected into an 18G IV and all 100 mL of contrast infiltrated in patient's left forearm.      CT was performed again on patient's left upper arm in 20G IV.  No issues. Leeann Rush on 10/19/2020 at 8:08 PM

## 2020-10-21 NOTE — PROGRESS NOTES
Talked to pt, explained that [facility] and [facility] do not currently have any beds available. Pt states that he understands and thanked me for letting him know. Pt also states that he wants to go home, he does not want to stay another night. Will notify Dr Cavazos .

## 2020-10-21 NOTE — PLAN OF CARE
Pt was angry, and argumentative earlier in the shift. Pt has had a good evening and has been pleasant and cooperative. Pt is resting in bed at this time.  VS: HR 95, O2 91% on 5LPM nc, Resp 21.      Problem: Adult Inpatient Plan of Care  Goal: Plan of Care Review  Outcome: No Change  Flowsheets (Taken 10/21/2020 1836)  Plan of Care Reviewed With: patient  Progress: no change  Goal: Patient-Specific Goal (Individualized)  Outcome: No Change  Flowsheets  Taken 10/21/2020 1836 by Laura Newby RN  Anxieties, Fears or Concerns: wants to go home.  Taken 10/19/2020 2343 by Janette Mcrae  Individualized Care Needs: (pt [facility] ) none  Goal: Absence of Hospital-Acquired Illness or Injury  Outcome: No Change  Intervention: Prevent Skin Injury  Recent Flowsheet Documentation  Taken 10/21/2020 1715 by Laura Newby RN  Body Position: position changed independently  Taken 10/21/2020 0946 by Laura Newby RN  Body Position: position changed independently  Goal: Optimal Comfort and Wellbeing  Outcome: No Change
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
## 2020-10-21 NOTE — PROGRESS NOTES
Attempted to reach pt emergency contact to update that pt has been transferred to ICU. Contact did not answer.
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
## 2020-10-21 NOTE — PROGRESS NOTES
:    Patient stated he has [facility] coverage and he is [facility].  I will call ALEC Milton to help look at insurance coverage.    EVANGELINA Edouard on 10/21/2020 at 3:24 PM

## 2020-10-21 NOTE — PROGRESS NOTES
Vapotherm removed and trialed off. Patient placed on 6L nasal cannula w/ bubbler, SpO2 95%. Nasal cannula turned down to 5L. Patient tolerating the 5L well at this time.    Hillary Ferguson, RT

## 2020-10-21 NOTE — PROGRESS NOTES
"Pt became upset and refused treatment when he was asked to get into bed in preparation for a CT. This nurse and a fellow nurse attempted to explain the need for him to be in bed for this procedure, pt became more angry and stated, \"I do not want to get into bed, I do not want any medication, or any more tests\" Pt also states that he want his lunch [facility].   MD notified.  "

## 2020-10-21 NOTE — PROGRESS NOTES
[facility] TRANSPORT NOTE  Data:   Reason for Transport:  Pt becoming increasingly more confused.    Alberto Khan was transported to ICU via bed at 0250.  Patient was accompanied by Registered Nurse, Nursing Assistant, RT, and supervisor. Equipment used for transport: Oxygen  Nasal Cannula and IV pump.    Action:  Report: given to JUVENAL Tejeda RN

## 2020-10-21 NOTE — PROGRESS NOTES
"Patient agitated with staff. Unable to assess orientation due to pt not answering questions when asked. Pt yelled, \"I don't know what is going on!\" This writer explained treatment plan multiple times to pt. Pt pulled out IV and took off O2. O2 dropped to 70s in saturation. Was put back on 5L and went up to 91% and educated on the importance of keeping the oxygen on. RT worked with pt and got pt on CPAP for the night plus the 5L of O2 and is currently at 91%. Refuses to keep capno on to monitor O2 from nurses station. Will continue to spot check overnight. New IV was placed in same spot as previous IV. IV infusing appropriately. VS: Temp: 99.3  F (37.4  C) Temp src: Tympanic BP: (!) 156/83 Pulse: 103   Resp: 24 SpO2: 91 % O2 Device: Nasal cannula(and CPAP) Oxygen Delivery: 5 LPM     Pt currently sleeping. Bernie Moya RN on 10/21/2020 at 12:04 AM    Pt awake and complaining of pain. Was given PRN Oxycodone, see MAR. Pt remains confused. Thinking the capno machine is the CPAP. Education provided. RT notified CPAP is off and this writer requests help to have it put back on pt. RT attempting to help pt with CPAP. Pt not keeping CPAP on. Pt constantly taking mask off and on and messing with the machine. Will notify MD of condition of pt.    MD King notified of change in pt mental status. Becoming increasingly more confused. Uncooperative with staff. Refusing to keep O2 on. Orders to transfer to ICU to be placed on BIPAP. Orders to give PRN 0.5 mg Ativan prior to. Bernie Moya RN on 10/21/2020 at 1:50 AM    Pt uncooperative getting into bed. 4 nurses able to get pt into bed to transfer to ICU.   "
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
## 2020-10-21 NOTE — PROGRESS NOTES
SAFETY CHECKLIST  ID Bands and Risk clasps correct and in place (DNR, Fall risk, Allergy, Latex, Limb):  Yes  All Lines Reconciled and labeled correctly: Yes  Whiteboard updated:Yes  Environmental interventions (bed/chair alarm on, call light, side rails, restraints, sitter....): Yes  Verify Tele #: not on tele.    Bernie Moya RN on 10/20/2020 at 11:54 PM    -late entry-

## 2020-10-21 NOTE — PROGRESS NOTES
"Pt became angry, uncooperative, and increased confusion. Pt states that he is tired of people telling him what to do, moving things in his house and instructed this nurse to \"stand over there and do not touch me\" This nurse was attempting to assist pt to the bathroom. Pt was assured that this nurse is only trying to help him, and that I hear him saying that he does not want any help. Pt was not easily redirected, but did calm down after a while. Pt is sitting in a recliner watching tv at this time.  "

## 2020-10-21 NOTE — PROGRESS NOTES
Patient brought to ICU from [facility] after ABGs done show Ph of 7.33. [facility] he is calm. He did receive 0.5mg Ativn prior to transfer. He is complaining of significant pain from his shoulders to his hips. Medicated with Morphine 2mg IV. He uses a pocket talker to aide with communication. Currently on Vapotherm 10L 40% O2

## 2020-10-21 NOTE — PROGRESS NOTES
Discharge Planner PT   Patient approached for evaluation, however, patient reports that he just returned to his recliner from the bathroom, was comfortable and did not wish to get up to ambulate. PT/OT will remain available to assist nursing staff with patient mobility and ADLs as needed.         Entered by: Alberto Choe 10/21/2020 4:21 PM

## 2020-10-22 NOTE — PROGRESS NOTES
10/22/20 1500   Quick Adds   Type of Visit Initial PT Evaluation   Living Environment   People in home alone   Current Living Arrangements house   Home Accessibility no concerns   Transportation Anticipated family or friend will provide;car, drives self   Self-Care   Usual Activity Tolerance good   Current Activity Tolerance fair   Equipment Currently Used at Home cane, straight   Disability/Function   Hearing Difficulty or Deaf yes   Describe hearing loss bilateral hearing loss   Use of hearing assistive devices left hearing aid   Wear Glasses or Blind no   Concentrating, Remembering or Making Decisions Difficulty no   Difficulty Communicating no   Difficulty Eating/Swallowing no   Walking or Climbing Stairs Difficulty yes   Walking or Climbing Stairs ambulation difficulty, requires equipment   Fall history within last six months no   General Information   Referring Physician Christine   Patient/Family Therapy Goals Statement (PT) return home   Existing Precautions/Restrictions fall;oxygen therapy device and L/min   Weight-Bearing Status - LLE full weight-bearing   Weight-Bearing Status - RLE full weight-bearing   Cognition   Orientation Status (Cognition) oriented x 3   Affect/Mental Status (Cognition) WFL;agitated   Follows Commands (Cognition) WFL   Pain Assessment   Patient Currently in Pain Yes, see Vital Sign flowsheet   Integumentary/Edema   Integumentary/Edema Comments bruising around right eye   Posture    Posture Not impaired   Range of Motion (ROM)   ROM Quick Adds ROM WFL   Strength   Manual Muscle Testing Quick Adds Strength WFL   Strength Comments however, patient fatigues with activity   Transfers   Transfers sit-stand transfer;bed-chair transfer   Bed-Chair Transfer   Bed-Chair [place] (Transfers) supervision   Assistive Device (Bed-Chair Transfers) walker, front-wheeled   Sit-Stand Transfer   Sit-Stand [place] (Transfers) supervision   Assistive Device (Sit-Stand Transfers) walker,  front-wheeled   Gait/Stairs (Locomotion)   [place] Level (Gait) supervision   Assistive Device (Gait) walker, front-wheeled   Distance in Feet (Required for LE Total Joints) 100 feet   Pattern (Gait) 2-point;step-through   Balance   Balance Comments fair with Fww   Sensory Examination   Sensory Perception Comments appears intact to light touch in LEs   Coordination   Coordination no deficits were identified   Muscle Tone   Muscle Tone no deficits were identified   Clinical Impression   Criteria for Skilled Therapeutic Intervention yes, treatment indicated   PT Diagnosis (PT) impaired mobility   Influenced by the following impairments fatigue, SOB   Functional limitations due to impairments activity/gait tolerance   Clinical Presentation Stable/Uncomplicated   Clinical Decision Making (Complexity) moderate complexity   Therapy Frequency (PT) Daily   Predicted Duration of Therapy Intervention (days/wks) 2-3 days   Planned Therapy Interventions (PT) gait training;transfer training;bed mobility training   Anticipated Equipment Needs at Discharge (PT) cane, straight;walker, rolling   Risk & Benefits of therapy have been explained risks/benefits reviewed   PT Discharge Planning    PT Discharge Recommendation (DC Rec) home with assist   PT Rationale for DC Rec patient demonstrating functional mobility (however, limited due to fatigue)   PT Brief overview of current status  transfers and ambulation with SBA and use of Fww for stability and energy conservation   Total Evaluation Time   Total Evaluation Time (Minutes) 15

## 2020-10-22 NOTE — PROGRESS NOTES
"Patient up at edge of bed, ate most of an omelet.  He has a furrowed look and is concerned this morning.  He touches his forehead and says to writer, \"Somethings not right up here.\"  He initially did not recall why he was in the hospital or any recollection of the last few days.  As we talked, he started to recall the men who worked last night and who were in his room.  He remembers parts of yesterday after some time, and is not sure why they never scanned his head.  He was informed that yesterday, he refused the procedure.  He becomes concerned again, and tells writer again, \"I'm loosing it.  I don't know how to describe what's going on up here,\" tapping his forehead again.  When asked specific questions, he confirmed that he is not completely able to remember segments of time and that he is having some difficulting formulating his thoughts into words.  Patient oriented to situation and given ample amounts of affirmation that he is safe and that staff are here to help him through this.  He appears to be accepting of this and has been cooperative, although still quite frustrated, sad, and anxious.  "

## 2020-10-22 NOTE — PLAN OF CARE
"Patient confused this morning, stating \"there is something wrong up here\" while pointing to his head. Is alert and oriented to everything but situation. Ambulated to bathroom with SBA and walker, able to get out of bed with assist of 1 to hold hand, currently sitting up on edge of bed. LS diminished, use of abdominal muscles, SpO2 97% on 6 lpm, does take O2 cannula out of nose periodically but is easily redirectable. No shortness of breath noted or reported.    Jessica Quesada RN on 10/22/2020 at 9:29 AM    "

## 2020-10-22 NOTE — PROGRESS NOTES
10/22/20 1100   General Information   Onset of Illness/Injury or Date of Surgery 10/19/20   Referring Physician Dr. King   Behavioral Issues difficulty managing stress;overwhelmed easily;verbal outbursts;other (see comments)  (benfited from repeat verbal redirections to participate)   Patient/Family Therapy Goal Statement (SLP) Pt aggitated throughout session and had difficulty stating goals at this time   Pertinent History of Current Problem See EMR.    Past History of Dysphagia Unclear; difficult to engage pt in conversation   Disability/Function   Hearing Difficulty or Deaf yes   Describe hearing loss bilateral hearing loss   Use of hearing assistive devices left hearing aid   Type of Evaluation   Type of Evaluation Swallow Evaluation   Oral Motor   Oral Musculature unable to assess due to poor participation/comprehension;generally intact   Structural Abnormalities none present   Dentition (Oral Motor)   Dentition (Oral Motor) edentulous;other (see comments)  (upper denture. Unclear if present. Difficulty w/participate.)   Facial Symmetry (Oral Motor)   Facial Symmetry (Oral Motor) WNL   Lip Function (Oral Motor)   Lip Range of Motion (Oral Motor) WNL   Lip Strength (Oral Motor) WNL   Lip Coordination (Oral Motor) left side   Tongue Function (Oral Motor)   Tongue ROM (Oral Motor) unable/difficult to assess   Tongue Strength (Oral Motor) unable/difficult to assess   Tongue Coordination/Speed (Oral Motor) unable/difficult to assess   Jaw Function (Oral Motor)   Jaw Function (Oral Motor) strength impairment (bite/clench)   Jaw Strength Impairment minimal impairment;moderate impairment   Cough/Swallow/Gag Reflex (Oral Motor)   Volitional Swallow (Oral Motor) unable/difficult to assess   Vocal Quality/Secretion Management (Oral Motor)   Vocal Quality (Oral Motor) WFL   General Swallowing Observations   Current Diet/Method of Nutritional Intake (General Swallowing Observations, NIS) dysphagia level 2 (mechanically  altered);nectar-thick liquids   Respiratory Support (General Swallowing Observations) nasal cannula  (5-6L)   Swallowing Evaluation Clinical swallow evaluation   Clinical Swallow Evaluation   Feeding Assistance minimal assistance required  (cues for small bites)   Rationale for completing additional evaluation To assess swallow function and for possible aspiration   Clinical Swallow Evaluation Textures Trialed Thin Liquids;Nectar-Thick Liquids;Honey-Thick Liquids;Semi-Solid;Puree Textures   Clinical Swallow Eval: Thin Liquid Texture Trial   Mode of Presentation, Thin Liquids straw;self-fed   Volume of Liquid or Food Presented 3 oz   Oral Phase of Swallow Premature pharyngeal entry  (suspected)   Pharyngeal Phase of Swallow throat clearing;wet vocal quality after swallow   Diagnostic Statement Pt demosntrated consistent wet vocal quality, SOB, audible exhalation, and occasional throat clears with thin liquids.    Clinical Swallow Evaluation: Nectar-Thick Liquid Texture Trial   Mode of Presentation, Nectar cup;self-fed   Volume of Nectar Presented 1 oz   Oral Phase, [place] WFL   Pharyngeal Phase, Nectar other (see comments);wet vocal quality after swallow  (audible exhalation; possible delayed phar. swallow)   Successful Strategies Trialed During Procedure, Nectar other (see comments)  (small sips)   Diagnostic Statement No overt S/sx of aspiration; however, pt frequently takes large bites/sips and may be at risk of aspiration with larger sips. Difficulty coordinating breathing for swallow. Frequently  inhaling and exhaling during oral and potentially pharyngeal phase of swallow, which will likely increase risk of aspiration.    Clinical Swallow Evaluation: Honey-Thick Liquid Texture Trial   Mode of Presentation, Honey cup;self-fed   Volume of Honey Presented .5 oz   Oral Phase, Honey WFL   Pharyngeal Phase, Honey other (see comments)  (audible exhalation; possible delayed phar. swallow)   Diagnostic Statement wet  vocal quality and inhalation/exhalation throughout oral and possibly pharyngeal phase which may increase risk of aspiration.    Clinical Swallow Evaluation: Puree Solid Texture Trial   Mode of Presentation, Puree spoon;self-fed   Diagnostic Statement Audible exhalation and wet vocal quality. Possible trace throat clear; however, large bites were taken. Possible increase safety with small bite size; however, unclear if pt will comply. Agitated throughout trials. Wet vocal quality/trace throat clears may be more likely related to residue. Difficulty coordinating breathing and swallow, which may impact strength and coordination/ability to clear residue. Difficult to attempt strategies due to agitation.   Clinical Swallow Evaluation: Semisolid Texture Trial   Mode of Presentation, Semisolid spoon;self-fed   Volume of Semisolid Food Presented 3 oz   Oral Phase, Semisolid Residue in oral cavity;Poor AP movement;other (see comments)  (prolonged mastication with inhalation)   Oral Residue, Semisolid   (anterior tonuge, difficult to visualize)   Pharyngeal Phase, Semisolid wet vocal quality after swallow;other (see comments)  (breathing throughout swallow phases, audible exhalation)   Diagnostic Statement Pt demonstrated audible exhalation/possible trace throat clear; however, difficult to assess as pt was SOB and inhaling/exhaling throughout trial. This may also increase risk of aspiration and weaken swallow.    Swallowing Recommendations   Diet Consistency Recommendations dysphagia level 2 (mechanically altered);nectar-thick liquids  (Pt likely at risk for aspiration across textures)   Supervision Level for Intake close supervision needed   Mode of Delivery Recommendations bolus size, small;no straws;slow rate of intake   Swallowing Maneuver Recommendations extra swallow   Monitoring/Assistance Required (Eating/Swallowing) check mouth frequently for oral residue/pocketing;monitor for cough or change in vocal quality with  intake   Recommended Feeding/Eating Techniques (Swallow Eval) maintain upright sitting position for eating;maintain upright posture during/after eating for 30 minutes   Comment, Swallowing Recommendations Pt demonstrated significant difficulty coordinating breathing across trials, as well as wet vocal quality, audible exhalation, delayed/prolonged swallow phases, and possible trace throat clears that may be residue related. Pt likely at risk for aspiration with PO intake and may benefit from video swallow study; however, due to anxiety and current state, pt is not appropriate at this time. Pt demonstrated difficulty with safe swallow strategy/compensation trials. Pt may benefit from continued speech therapy for strengthening, coordination, intervention for implementing safe swallow strategies, diet modifications, and education. Pt also may benefit from video swallow study to further assess swallow. This was discussed with pt's attending. Given pt current status and discussion with pt's attending, pt is not appropriate for video swallow and some interventions at this time as the risks outweigh the benefits. SLP to continue to monitor and remain involved if or when pt status changes.   SLP Therapy Assessment/Plan   Criteria for Skilled Therapeutic Interventions Met (SLP Eval) yes   SLP Diagnosis Dysphagia   Rehab Potential (SLP Eval) fair, will monitor progress closely   Therapy Frequency (SLP Eval) other (see comments)  (1-2x per week)   Predicted Duration of Therapy Intervention (SLP Eval) Duration of this hospital stay   Therapy Plan Review/Discharge Plan (SLP)   Therapy Plan Review (SLP) evaluation/treatment results reviewed;risks/benefits reviewed;care plan/treatment goals reviewed;patient   SLP Discharge Planning    SLP Discharge Recommendation (DC Rec) Long term care facility;Transitional Care Facility   SLP Rationale for DC Rec Assessment outcomes from 10/22/2020   SLP Brief overview of current status  Pt  demonstrated some difficulty with coping with environment with comprehension of care plan. Pt may benefit from continued speech therapy for functional skills. Pt may benefit from further cognitive assessment when appropriate/at discretion of MD/DO.    Therapy Certification   Start of Care Date 10/22/20    Total Evaluation Time   Total Evaluation Time (Minutes) 20   Coping Strategies   Trust Relationship/Rapport questions encouraged;thoughts/feelings acknowledged
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
## 2020-10-22 NOTE — PROGRESS NOTES
:    PT/OT will assess patient again today as he refused to work with yesterday.  It is recommended that patient needs to go to an SNF or at least have home care services.  Patient may not be willing to do either of them.  Patient will be nee ing home oxygen upon discharge. RT is aware.  will discuss these options tomorrow with patient.  Anticipated discharge in a few days.    EVANGELINA Edouard on 10/22/2020 at 3:50 PM
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
## 2020-10-22 NOTE — PROGRESS NOTES
"Up to the bathroom with walker and SBA. After sitting on the toilet for awhile, he stated that he \"could not get up. I'm sleeping.\" With assist, he stood and ambulated back to bed, but was not as strong on his feet as the last two times he ambulated to the bathroom. Accucheck performed;  Blood glucose was 160. No S/S of SOB. Orientation at baseline. VSS. Sats 99%.  "

## 2020-10-22 NOTE — PROGRESS NOTES
10/22/20 1500   Signing Clinician's Name / Credentials   Signing clinician's name / credentials Ashkan Choe MPT   Quick Adds   Rehab Discipline PT   Quick Adds Mobility;Therapeutic Activity   Functional Transfer Training   Treatment Detail SBA with Fww   Gait Training   Symptoms Noted During/After Treatment (Gait Training) fatigue;shortness of breath   Distance in Feet (Required for LE Total Joints) 100 feet   Treatment Detail ambulation in hallway   [place] Level (Gait Training) stand-by assist   Physical Assistance Level (Gait Training) 1 person + 1 person to manage equipment   Weight Bearing (Gait Training) full weight-bearing   Assistive Device (Gait Training) rolling walker   Gait Analysis Deviations decreased nima   PT Discharge Planning    PT Discharge Recommendation (DC Rec) home with assist   PT Rationale for DC Rec patient demonstrating functional mobility (however, tolerance limited due to fatigue)   PT Brief overview of current status  transfers and ambulation with SBA and use of Fww for stability and energy conservation   Additional Documentation   Rehab Comments fair stability with Fww; gait limited by fatigue   PT Plan continue PT   Total Session Time   Total Session Time (minutes) 40 minutes      Anh Brown R.N.

## 2020-10-22 NOTE — PROGRESS NOTES
Updated Dr. Knig of ongoing anxiety and agitation, unable to satisfy with 1:1, attempt to calm, given choices, and redirection, order for Ativan 0.5 mg now.    Jessica Quesada RN on 10/22/2020 at 11:01 AM    Currently resting in regular chair at this time, continues to be anxious but is having less of an impact on his mood.    Jessica Quesada RN on 10/22/2020 at 1:05 PM
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
## 2020-10-23 NOTE — PROGRESS NOTES
Pt is confused and disoriented to situation. He knows he is at the hospital but doesn't understand why we need to be helped or monitored in the room. Pt reoriented multiple times, but forgetful. Pt often demands to be left alone, refusing assistance. Writer has made all available efforts to ensure safety for Pt. Pt ambulates well with walker, refused gait belt and often demands writer not be close to him during ambulation. Environment cleaned/cleard to reduce chance of falls.

## 2020-10-23 NOTE — PROGRESS NOTES
"Pt woke, tried to get out of bed, pulled off his oxygen, pulled off his heart monitor leads. Writer attempted to replace O2 and place gait belt on Pt as pt stated he needed to go to the bathroom. Pt removed gait belt, writer replaced gait belt, Pt became verbally aggressive and pulled off gait belt while blocking writers ability to placed NC O2. Writer attempted to convince pt of the need for O2, gait belt, to prevent a fall and drop in saturation. Pt refused, saying, \"get the hell away from me.\" Pt was made aware of risk involved with his actions including death from not using the supplemental O2.  Pt would not acknowledge. Pt walked with walker to bathroom and back. At this time writer tried to placed O2, Pt refused, aggressively.   "

## 2020-10-23 NOTE — PROGRESS NOTES
Pt has been resistive to staff efforts to keep him safe in room with proper transfer methods/equipement including gait belt. Pt will demand staff leave room or stay away form him when he attempts to transfer. When staff try to assist him, he becomes verbally agressive. Staff has repeatedly reoriented Pt to room and situation, repeating need for safety measures. Pt refuses to allow writer to medically assess. Pt has accepted to wear O2, but other wise pulls as heart monitors, saturation monitors, and BP cuff. Staff are doing the best we can to provide choices to Pt, reorientate him, and keep him safe/comfortable.

## 2020-10-23 NOTE — PROGRESS NOTES
Pt awake and sitting at bed side. Restless. States that he doesn't know what he wants, uncooperative. Confused of his situation. Reoriented but forgetful. VSS.

## 2020-10-23 NOTE — PROGRESS NOTES
:    Met with patient to discuss discharge plans. It is patient's goal to discharge home today. Discussed concerns expressed by DO and PT/OT regarding patient's safety at home. Discussed SNF options. Patient declined. He is adamant about returning home. Discussed home care services for PT/OT. Patient declined that as well. He reports that he already has support at home. He reports having home health aide services through  Home [facility]. Patient stated that this writer could contact  [facility].     Called Margarette at [address] [place] [address]. She reports that patient receives 7 hours of home health aide services per week. She believes these services are spread across 2 or 3 days per week. Margarette also reports that a nurse visits patient 1 time per month. Notified Margarette that patient may be discharging today and home health aide may want to check in with patient later today or tomorrow. Margarette reports that she will pass this along to the HHA that works with patient.     Provided update to DO regarding patient's discharge plans.     BOUBACAR Fragoso on 10/23/2020 at 1:44 PM    Addendum:    Met with patient again and provided update that HHA from [address] [place] [address] would likely be out to see him later today or tomorrow. Asked patient about transportation plans at discharge. Patient reports that he has a neighbor that could give him a ride, but he doesn't want to bother the neighbor now as he is hunting. Patient reports that he does not have anyone else to call for a ride.     Provided update to RN that patient may be able to call neighbor for a ride at discharge, provided he is not hunting.     BOUBACAR Fragoso on 10/23/2020 at 2:06 PM    Addendum:    Received voicemail from Margarette at [address] [place] [address]. She reports that she spoke with patient's worker, Shea and that Shea will be out to see patient at his home on Saturday. Shea would like patient to call her when he is home.     Called ICU and provided update. Requested  that patient is notified that he needs to call Shea when he returns home and that Shea will be out to see him on Saturday.     BOUBACAR Fragoso on 10/23/2020 at 2:08 PM

## 2020-10-23 NOTE — PROGRESS NOTES
"   10/23/20 1400   Signing Clinician's Name / Credentials   Signing clinician's name / credentials Ashkan Choe MPT   Quick Adds   Rehab Discipline PT   Functional Transfer Training   Treatment Detail SBA with Fww   Gait Training   Symptoms Noted During/After Treatment (Gait Training) fatigue;shortness of breath   Distance in Feet (Required for LE Total Joints) 200    Treatment Detail ambulation in hallway   Evangeline Level (Gait Training) stand-by assist   Physical Assistance Level (Gait Training) 1 person + 1 person to manage equipment   Weight Bearing (Gait Training) full weight-bearing   Assistive Device (Gait Training) rolling walker   Gait Analysis Deviations decreased nima   PT Discharge Planning    PT Discharge Recommendation (DC Rec) home with assist   PT Rationale for DC Rec patient demonstrating functional mobility (however, tolerance limited due to fatigue)   PT Brief overview of current status  fair mobility/stability with Fww; patient able to ambulate with Fww 200+ feet on supplemental O2;  decrease in O2 sats noted during ambulation   Additional Documentation   Rehab Comments patient continues to require encouragement with PT expressing that he does not understand why we are all coming to see him; he wants to \"go home.\"   PT Plan continue PT   Total Session Time   Total Session Time (minutes) 40 minutes     "
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
## 2020-10-23 NOTE — PROGRESS NOTES
OFFICE VISIT      SUBJECTIVE:   HISTORY OF PRESENT ILLNESS:  Joseph bentley Oca is a 7 month old male who presents today for sick for the past 3 weeks; with fevers on and off for which mom is giving tylenol  Started with fever, runny nose and congestion that has continued   Just got back from trip to [place] where physician there gave him drops for phlegm and virus.  Eating less; taking juice  With normal wet diapers of at least 6 per day, normal stooling   With siblings not sick          PAST MEDICAL HISTORY:  History reviewed. No pertinent past medical history.    MEDICATIONS:  Current Outpatient Medications   Medication Sig   • amoxicillin (AMOXIL) 400 MG/5ML suspension Take 4.3 mLs by mouth 2 times daily for 10 days.     No current facility-administered medications for this visit.        ALLERGIES:  ALLERGIES:  No Known Allergies    PAST SURGICAL HISTORY:  History reviewed. No pertinent surgical history.    FAMILY HISTORY:  Family History   Problem Relation Age of Onset   • Cancer Neg Hx    • Diabetes Neg Hx    • Heart disease Neg Hx    • Stroke Neg Hx        SOCIAL HISTORY:  Social History     Tobacco Use   • Smoking status: Never Smoker   • Smokeless tobacco: Never Used   Substance Use Topics   • Alcohol use: Not on file   • Drug use: Not on file       Review of Systems   Constitutional: Positive for activity change, appetite change and fever. Negative for crying and irritability.   HENT: Positive for congestion and rhinorrhea. Negative for ear discharge and trouble swallowing.    Eyes: Negative for discharge and redness.   Respiratory: Positive for cough. Negative for choking and wheezing.    Cardiovascular: Negative for fatigue with feeds, sweating with feeds and cyanosis.   Gastrointestinal: Negative for abdominal distention, constipation, diarrhea and vomiting.   Genitourinary: Negative for decreased urine volume.   Musculoskeletal: Negative for extremity weakness.   Skin: Negative for color change and  Pt SpO2 87% on RA, at rest. Placed on NC, increasing to 3L, for an SpO2 of 92%, at rest. Sue Romero RRT   rash.   Neurological: Negative for facial asymmetry.   Hematological: Negative for adenopathy.         OBJECTIVE:     Visit Vitals  Pulse 117   Temp (!) 101.8 °F (38.8 °C) (Temporal)   Resp 36   Ht 26.38\" (67 cm)   Wt 7.654 kg (16 lb 14 oz)   HC 44.5 cm (17.52\")   SpO2 99%   BMI 17.05 kg/m²       Physical Exam   Constitutional: He appears well-developed and well-nourished. He has a strong cry.   HENT:   Head: Anterior fontanelle is flat.   Right Ear: Tympanic membrane is abnormal.   Left Ear: Tympanic membrane is abnormal.   Nose: Mucosal edema, rhinorrhea, nasal discharge and congestion present.   Mouth/Throat: Mucous membranes are moist. Oropharynx is clear.   TMs markedly erythematous with bulging ???serous appearing    Eyes: Conjunctivae and EOM are normal. Pupils are equal, round, and reactive to light.   Neck: Normal range of motion. Neck supple.   Cardiovascular: Normal rate and regular rhythm.   Pulmonary/Chest: Effort normal and breath sounds normal.   Abdominal: Soft. Bowel sounds are normal.   Musculoskeletal: Normal range of motion.   Neurological: He is alert. He has normal strength.   Skin: Skin is warm and dry. Turgor is normal.       Wt Readings from Last 1 Encounters:   12/17/18 7.654 kg (16 lb 14 oz) (20 %, Z= -0.84)*     * Growth percentiles are based on WHO (Boys, 0-2 years) data.          DIAGNOSTIC STUDIES:   LAB RESULTS:  No results found for this visit on 12/17/18.    Assessment AND PLAN:   This is a 7 month old year-old male who presents with fever and congestion.    Diagnoses and all orders for this visit:  Bilateral serous otitis media, unspecified chronicity  -     amoxicillin (AMOXIL) 400 MG/5ML suspension; Take 4.3 mLs by mouth 2 times daily for 10 days.       Return in about 1 week (around 2018) for needs 6 months well child checkup.     Discussed findings with mother; discussed pathophysiology of OM, role of antibiotics and usual course of illness.  Reviewed acetaminophen dosing  as mom not giving enough based on weight.    Chart review reveals needs 6 months well child checkup; reminded mother of importance of regular follow-up and immunizations schedule.      Instructions provided as documented in the AVS.      The mother indicated understanding of the diagnosis and agreed with the plan of care.      Aruna GODDARD, FNP-C  Nurse Practitioner  2018
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
## 2020-10-24 NOTE — PHARMACY - DISCHARGE MEDICATION RECONCILIATION AND EDUCATION
[facility]  [facility] of [facility]  [address] [place] [id number]    October 24, 2020    Dear Pharmacist,    Your customer, Alberto Khan, born on 1940, was recently discharged from [facility].  We have updated his medication list and want to alert you to the following:       Review of your medicines      START taking      Dose / Directions   amoxicillin-clavulanate 875-125 MG tablet  Commonly known as: AUGMENTIN  Indication: Pneumonia caused by Inhaling a Substance Into the Lungs, Community Acquired Pneumonia  Used for: Community acquired pneumonia, unspecified laterality, Aspiration pneumonia, unspecified aspiration pneumonia type, unspecified laterality, unspecified part of lung (H)      Dose: 1 tablet  Take 1 tablet by mouth 3 times daily (with meals) for 6 days  Quantity: 18 tablet  Refills: 0        CONTINUE these medicines which have NOT CHANGED      Dose / Directions   aspirin 81 MG EC tablet      Dose: 81 mg  Take 81 mg by mouth daily (with breakfast)  Refills: 0     ferrous sulfate 325 (65 Fe) MG EC tablet  Commonly known as: FE TABS      Dose: 325 mg  Take 325 mg by mouth every morning  Refills: 0     ibuprofen 400 MG tablet  Commonly known as: ADVIL/MOTRIN      Dose: 400 mg  Take 400 mg by mouth 4 times daily as needed for pain  Refills: 0     insulin aspart prot & aspart (70-30) 100 UNIT/ML vial  Commonly known as: NovoLOG MIX 70/30 VIAL      Inject 35 units subcutaneously in the AM and 40 units in the PM  Refills: 0     lisinopril-hydrochlorothiazide 20-25 MG tablet  Commonly known as: ZESTORETIC      Dose: 1 tablet  Take 1 tablet by mouth every morning  Refills: 0     Magnesium Oxide 420 MG Tabs      Dose: 420 mg  Take 420 mg by mouth every morning  Refills: 0     metFORMIN 1000 MG tablet  Commonly known as: GLUCOPHAGE      Dose: 1,000 mg  Take 1,000 mg by mouth 2 times daily (with meals)  Refills: 0     SENIOR MULTIVITAMIN PLUS PO       Dose: 1 tablet  Take 1 tablet by mouth every morning  Refills: 0     simvastatin 80 MG tablet  Commonly known as: ZOCOR      Dose: 40 mg  Take 40 mg by mouth At Bedtime  Refills: 0     triamcinolone 0.1 % external cream  Commonly known as: KENALOG      Apply topically 2 times daily as needed for irritation Applies to feet  Refills: 0           Where to get your medicines      These medications were sent to [facility] - [place], [place] - [address]  [address], [place] [address]    Phone: [phone]     amoxicillin-clavulanate 875-125 MG tablet         We also reviewed Alberto Khan's allergy list and updated it as needed:  Allergies: Gabapentin and No known drug allergies    Thank you for continuing to care for Alberto Khan.  We look forward to working together with you in the future.    Sincerely,  Elif Saini, [facility]

## 2020-10-24 NOTE — ED AVS SNAPSHOT
[facility]  [address]  [place] [address]  Phone: [phone]  Fax: [phone]                                    Alberto Khan   MRN: [id number]    Department: [facility]   Date of Visit: 10/24/2020           After Visit Summary Signature Page    I have received my discharge instructions, and my questions have been answered. I have discussed any challenges I see with this plan with the nurse or doctor.    ..........................................................................................................................................  Patient/Patient Representative Signature      ..........................................................................................................................................  Patient Representative Print Name and Relationship to Patient    ..................................................               ................................................  Date                                   Time    ..........................................................................................................................................  Reviewed by Signature/Title    ...................................................              ..............................................  Date                                               Time          22EPIC Rev 08/18
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
## 2020-10-24 NOTE — PROGRESS NOTES
10/24/20 0900   Signing Clinician's Name / Credentials   Signing clinician's name / credentials Sera Dutton CCC-SLP   Quick Adds   Rehab Discipline SLP   Quick Adds Speech Language Pathology   SLP - Dysphagia/Swallow   Minutes of Treatment 60 minutes   Symptoms Noted During/After Treatment Fatigue;Shortness of breath   Treatment Detail Pt met in room for skilled dysphagia intervention. Pt was agitated and moving around during evaluation. PT trialed wtih NDD1 and NDD2 textures, as well as thin, nectar thick, and honey thick liquids. Pt demonstrated trace throat clears with thin liquids, as well as wet audible exhalations and vocalizations after all trials. Continue small sips and nectar thick liquids to reduce risk of aspiration. Risk of continued aspiration and possible benefit from video swallow study discussed with attending on 10/22/2020. Per discussion, pt not appropriate for video swallow study at this time and to recommend safest possible diet (NDD2 with nectar thick liquids). Limited response to cues for smaller bites; however, overall smaller bites and occasional smaller sips. Decreased SOB and wet vocalization/exhalation with small sips. Dysphagia may be impacted by decreased breath support, coordination, and strength.   SLP Discharge Planning    SLP Discharge Recommendation (DC Rec) Long term care facility;Transitional Care Facility   SLP Brief overview of current status  See above. Pt demonstrated difficulty with auditory comprehension. This may be impacted by concussion and/or hearing loss. Pt benefited from written education with short phrases. Pt participated in discussion of questions with written information. Handouts on thickened liquids, diet recommendations, and therapy outcomes provided, as well as verbal education on handouts. Some difficulty hearing; however, repeat education provided for clarification. Education on benefit of recommendations and continued speech therapy provided on  written handout and verbally. Education on how to continue speech therapy, benefits of speech therapy, and risks of PO intake at this time provided written and verballly. Recommend: continue speech therapy, NDD2 with nectar thick liquids, no strarws, small sips and bites, and oral cares before and after meals.    Additional Documentation   Rehab Comments Pt required verbal cues to participate. Education provided written and verbally on reason for speech therapy.   PT Plan Continue speech therapy   Total Session Time   Total Session Time (minutes) 60 minutes

## 2020-10-24 NOTE — PROGRESS NOTES
i called to bring patient home in [place]. [facility] notified of pt discharge and will meet him at his house for home 02 set up. Patient left wiith tank that was brought in yesterday. Belongings including home cpap packed up and sent with patient. IV removed prior to discharge (removed by patient overnight). Augmentin supply sent with patient and pharmacist reviewed this with patient as he was unable to pay for it at pharmacy of choice. Is [facility] and it is a weekend so other arranagements had to be made today. F/u appointment discussed with patient. Discharge orders reviewed.     Bernie Duong RN on 10/24/2020 at 11:10 AM

## 2020-10-24 NOTE — PROGRESS NOTES
Patient transferred from ICU at 1902. Report receieved from JUVENAL Wood. Patient is sitting upright in chair. He does not want to lie down. [facility] coming within the hour to bring his home 02 set up here for morning discharge to home. Will report to night shift nurse.     Bernie Duong RN on 10/23/2020 at 7:03 PM

## 2020-10-24 NOTE — PHARMACY - DISCHARGE MEDICATION RECONCILIATION AND EDUCATION
Pharmacy:  Discharge Counseling and Medication Reconciliation    Alberto Khan   [address]  [facility] [place] [address]  [phone] (home)   79 year old male  PCP: No Ref-Primary, Physician    Allergies: Gabapentin and No known drug allergies    Discharge Counseling:    Pharmacist met with patient  today to review the medication portion of the After Visit Summary (with an emphasis on NEW medications) and to address patient's questions/concerns.    Summary of Education: Reviewed amoxicillin/clavulanate verbally and in print    Materials Provided:  MedCounselor sheets printed from Clinical Pharmacology on: amoxicillin/clavulanate    Discharge Medication Reconciliation:    It has been determined that the patient DOES NOT have an adequate supply of medications available nor which can be obtained from the patient's preferred pharmacy. Cancelled prescription sent to [facility] (closed). Offered to send to [facility]. Patient has not vehicle. Per doctor, okay'd to fill here as emergency takehome. An updated medication list will be faxed to the patient's homecare.    Thank you for the consult.    Elif Saini [facility]........October 24, 2020 11:23 AM         Fall Risk
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
## 2020-10-24 NOTE — PHARMACY
Pharmacy - Transfer Medication Reconciliation     The patient's transfer medication orders have been compared to the medication administration record and to the Prior to Admissions Medications list - any noted discrepancies were resolved with the MD.     Thank you. Pharmacy will continue to monitor.     Elif Saini [facility] ....................  10/24/2020   8:55 AM

## 2020-10-24 NOTE — PROGRESS NOTES
Writer called Dillon at [facility] with discharge time for pt. Jessica Soto RN on 10/24/2020 at 11:05 AM

## 2020-10-25 NOTE — ED NOTES
Pt brought to bathroom with assist of walker, O2 at 2L NC on portable tank, Pt advised to use call light system in bathroom when he is done.

## 2020-10-25 NOTE — ED NOTES
Pharmacy called for med rec with pt to order home medications. Pt wants his pain pills, MD notified.  Katie Nelson RN

## 2020-10-25 NOTE — ED NOTES
Unable to collected urine sample as it was contaminated with stool. Emilia Willingham RN on 10/24/2020 at 11:26 PM

## 2020-10-25 NOTE — ED NOTES
Pt activates call light, brought back to bay 910. O2 sat steady at 96%, pt requesting continues o2 monitoring on portable vitals monitor. Pt stating he is in a lot of pain, will notify primary RN of his pain.

## 2020-10-25 NOTE — ED NOTES
"Pt awake, yelling out for help.  When asked what patient needed, he stated \"I've been in bed for 6 days and I cannot get comfortable.\"  RN helped patient reposition in bed; offered patient a recliner but pt declined.  Pt accepting of tylenol.  Emilia Willingham RN on 10/25/2020 at 4:59 AM    "
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
## 2020-10-25 NOTE — ED NOTES
Per DEC: [facility] will not accept a patient on oxygen.  DEC  is faxing information for [facility], which may be able to accept him, [facility], which may be able to accept him after midnight, and [facility]. Fax numbers and phone numbers included in fax information.
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
## 2020-10-25 NOTE — PHARMACY-ADMISSION MEDICATION HISTORY
Pharmacy -- Admission Medication Reconciliation    Prior to admission (PTA) medications were reviewed and the patient's PTA medication list was updated.    Sources Consulted: Previous Med Rec 10/20/2020, patient, MAR from admission this week    The reliability of this Medication Reconciliation is: Reliability: Borderline reliable    The following significant changes were made:  Changed insulin to match va  Added times from [facility] MAR and patient    In addition, the patient's allergies were reviewed with the patient and left as follows:   Allergies: Gabapentin and No known drug allergies    The pharmacist has reviewed with the patient that all personal medications should be removed from the building or locked in the belongings safe.  Patient shall only take medications ordered by the physician and administered by the nursing staff.       Medication barriers identified: unsure of patient's compliance   Medication adherence concerns: yes   Understanding of emergency medications: no glucose tabs    Elif Saini [facility], 10/25/2020,  1:18 PM

## 2020-10-25 NOTE — ED NOTES
[facility] called and stated they would not be able to accept patient today but they would re-evaluate accepting patient tomorrow possibly. juan daniel Wilson RN updated and notified.

## 2020-10-25 NOTE — ED NOTES
Patient amb hallway with walker to restroom. Tolerated well. Currently resting in bed. Bed alarm placed.

## 2020-10-25 NOTE — ED NOTES
Sue, RN charge, stated they would not be able to accept patient today. However, she stated tomorrow more staff will re-evaluate his paperwork and look into accepting him.

## 2020-10-25 NOTE — ED NOTES
Patient ate 75% of breakfast this AM. Ambulated to restroom with A x 1 with walker. Tolerated well.

## 2020-10-25 NOTE — ED NOTES
Call made to JUVENAL Rogers charge at . This writer informed charge nurse patient is willing to go to  for rehab services. Sue stated she will have to make a call to the  @  to see if they can accept patient and then will call back with update.

## 2020-10-26 NOTE — PROGRESS NOTES
:    I received a call back from Tata at [facility] and they will not accept patient today. They will screen tomorrow.    EVANGELINA Edouard on 10/26/2020 at 3:12 PM    :    I called Margarette at  Home Care to see if they could come out before Friday to see patient.  She is reaching out to his worker Shea and will let us know. We could reach out to [facility] tomorrow to see if they could possibly follow up if patient decided to go home at discharge instead of going to a nursing home.  Will follow up tomorrow with both nursing homes.     :EVANGELINA Edouard on 10/26/2020 at 4:11 PM

## 2020-10-26 NOTE — ED NOTES
DEC assessment completed.  states will call [facility] and [facility] and discuss pt condition.  Contact information provided. Message left for social work.

## 2020-10-26 NOTE — PROGRESS NOTES
:    Received a call from [facility] MD stating patient was in the ED on Saturday and he received report that [facility] nurse Bree had some concerns. He also mentioned that maybe a home care  could look at other placement options or he is needing a home health aid. I called [facility] and spoke with JUVENAL Choi and updated her on Doctors concerns and she stated a [facility] report was filed this weekend on patient from [facility] and they would take care of it.    EVANGELINA Edouard on 10/26/2020 at 3:42 PM

## 2020-10-26 NOTE — ED NOTES
"Patient refused metformin and insulin. \" I do not take those meds now, I will not take them for you\". Was able to administer antibiotic after providing education on importance. Still declining other medications.   "

## 2020-10-26 NOTE — ED NOTES
"Patient raising his voice with staff. \"I am leaving. I need to get out of here\". Sitter at bedside, security explaining situation to patient.   "

## 2020-10-26 NOTE — ED NOTES
Pt from chair to bed, back to chair.  Socks changed.  Pt given milk.   Reassurance and comfort provided.

## 2020-10-26 NOTE — ED NOTES
"Pt sitting up in chair, raising voice \"I just want to get out of here!\" Tearful.  States he believed that a bed at [facility] had been arranged yesterday and \"cant believe it is taking so long\" to get him moved to [facility].  Explained to pt the process and possible wait time.  Pt states he came back to ER after being hospitalized because when he got home from the hospital his home health aide was not there to help him.  Home care reports that pt will allow only one specific aid in to help him and she sees him 2 days a week.   "

## 2020-10-26 NOTE — ED NOTES
Call placed to [facility].  States  has not been with them since 4/19, has been seen at [facility].  Inquired about possible services or assistance. Contact for [facility] provided, message left for pts VA primary care provider ([phone], ext [phone]).

## 2020-10-26 NOTE — PROGRESS NOTES
:    I received a call from JUVENAL Ellis at [facility] and they will have a male and female opening tomorrow.  I sent referral over to them to start screening. I left message for Tata at [facility] to see if they have had a chance to review referral. Updated Jeannie in the ED.    EVANGELINA Edouard on 10/26/2020 at 2:38 PM

## 2020-10-26 NOTE — PROGRESS NOTES
Clinic Care Coordination Contact  Care Team Conversations    Patient returned to ED, currently awaiting potential placement in a skilled nursing facility as he is not safe to return home alone. Will follow up after placement as needed. Janelle Wesley RN on 10/26/2020 at 9:41 AM

## 2020-10-26 NOTE — ED NOTES
OT states that d/t pt post concussive state, dementia screen may not be very conclusive.  Pt agitated. Ambulates on own with walker, however is not careful with O2, will walk and continue to walk while still attached pulling on face.

## 2020-10-26 NOTE — ED NOTES
"Pt put call light on; stating he is having abdominal pain.  When asked about his pain, pt got frustrated and stated \"It's all across here\" when pointing at his abdomen.  Non specific.  Belly is soft with palpation.  BS faint throughout all quadrants.  Will update MD.  Stating \"I've been here 6 days and no one is telling me anything.\"  Explained to patient that we are working on helping him.  "

## 2020-10-26 NOTE — ED NOTES
"Patient has wallet on him with $132 in cash.  This RN witnessed patient count out approx $132.  Pt states \"I had $271 when I came in here last night, nothing has been right since I got here Monday\".  Before patient made statement, he stated \"I had $400 on me when I first got here.\"  This RN stated that we were not aware he had his wallet on him.  He stated \" I know, you did not know.\"  This RN offered to lock it up in the safe for him and he stated that \"Well it is a little too late now\".  Pt put his wallet back in his pocket and requested to keep it on him.  Will continue to offer to lock up belongings.  Emilia Willingham RN on 10/25/2020 at 11:34 PM    "

## 2020-10-26 NOTE — ED NOTES
Social work notified that patient reports he is willing to consider SNF.     Janette Ambriz RN on 10/26/2020 at 8:01 AM

## 2020-10-26 NOTE — PROGRESS NOTES
" 1439 hold initiated. Patient was yelling at staff. Told the MD \"I am going to leave and you're not going to do anything about it. I am going to get my truck and get out of here.\".   "

## 2020-10-26 NOTE — ED NOTES
MD to bedside.  Pts emergency contact, Darek (pts neighbor, [phone]) is called, states only checks in on pt occasionally, does not have availability to stay with pt for extended time.  No other family available.  states that pt is not eligible for more than 7 hours of home health care visits per week per the [facility].  [facility] reports will screen tomorrow.  [facility] may have bed tomorrow.

## 2020-10-26 NOTE — ED NOTES
[facility] calls inquiring about pt and wondering about disposition.  Request update when disposition known as they care for him.  [phone].

## 2020-10-26 NOTE — ED NOTES
Pt reporting loose stools, updated MD. VALADEZ in to see patient. Pt has been up to bathroom multiple times today

## 2020-10-26 NOTE — ED NOTES
Pt found without his O2 on multiple times thoughout the night.  Pt reminded that he needs this for his breathing to improve.  Pt frustrated.

## 2020-10-26 NOTE — PROGRESS NOTES
:    Received referral from the ED that patient may need nursing home placement and that patient agreed to go to SNF [facility].  I made referral  to SNF [facility] and spoke with Tata.  Tata stated they will screen patient, but she concluded that he may have had some hallucinations and set off some bed alarms in the ED and she had quite a few admissions already today.  I called Rhonda at [facility] and they are seeing if they even have any open beds today incase he would like to go there.  I received a call from Margarette at  [facility] and she stated they did not go out to his home as the chart indicated on Saturday morning.  Patient was supposed to call his worker when he got home and never did.  Margarette stated they went out this morning to check on him and he was not there.  Margarette said the next time they could go out to provide cares wouldn't be until Friday.  I called [facility] and spoke with Jeannie and it appears a DEC assessment was completed when he first arrived and they are recommending inpatient psych treatment.  Discussed with her that maybe another assessment will need to be completed to see if they recommend any other disposition and in SNF.    EVANGELINA Edouard on 10/26/2020 at 10:38 AM

## 2020-10-27 NOTE — PROGRESS NOTES
:    I called [facility] for support and possible safety plan and they put me through to Iila and she is actually visiting with patient currently.  I gave her a brief update and she will have ED RN call me when she is done.    EVANGELINA Edouard on 10/27/2020 at 10:38 AM    :    I received a call from [facility] and they stated they are pulling out and will not be providing services for patient any longer due to patient being verbally abusive to staff and she is just finding this out.    EVANGELINA Edouard on 10/27/2020 at 11:57 AM

## 2020-10-27 NOTE — PROGRESS NOTES
:  Received update that patient is wanting to go home and not go to a nursing home. It appears patient was placed on a 72 hr. hold last night. Placed calls to both [facility] and [facility] to place referrals on hold as patient needs to be willing to go and needs to participate taking medications and ongoing therapy.  RN was going to call [facility] to see if they could offer any support services for him at home.  I called Margarette at [facility] and she stated that Shea his PCA would be able to go to his home everyday and stretch out her hours to help him at home.  Margarette stated Shae could go out today.  I am waiting for a call back from Margarette on what time his PCA could possibly meet him at his home if MD would agree to this. I called [facility] to see if patient possibly had a  and he does not have one.  I received contact information for a Aida Goldman [phone] who is a  at the [facility] in [facility].  I left message for her to see if they could increase his hours for PCA services and to see if there were any other support options for patient.      EVANGELINA Edouard on 10/27/2020 at 10:17 AM

## 2020-10-27 NOTE — ED NOTES
Pt tolerated activity. Is sitting in a chair. Pt is calm at this time. Has no requests. Took out his hearing aide.

## 2020-10-27 NOTE — ED NOTES
Per social work. PCA Shea will be able to go to patient's home everyday this week. To help patient get set up at home.

## 2020-10-27 NOTE — PLAN OF CARE
Orders received to complete SLUMS Examination to assist with discharge planning. Pt is well known to this Therapist as he participated in OT during previous admission. Pt was mostly pleasant to Therapist this date but clearly very frustrated as to why he cannot make a phone call or leave the hospital, was perseverating on the fact that he needs his fuel tank filled and worried about his 2 cats in his home. Pt repeatedly asked if Therapist could help him talk or call someone who could assist him with his concerns, explained importance of exam and how it could help him with a discharge plan.     Pt was oriented to date, place, situation,and responded to questions initially with appropriate and witty humor, Pt then received his breakfast and wanted to eat it while it was hot and did not want to further participate in exam. Difficult to fully assess short term memory at this time as pt is more focused and perseverating on taking care of his home and leaving the hospital. Question if pt would be safe to care for himself at home at his time and remember to take medications, etc. Pt would benefit from 24 hour supervision and or assist for higher level processing to care for self. Pt is physically moving with SBA only, needs safety cues and reminders to manage oxygen cord, etc.     Please contact Tavia at [phone] with any further questions or concerns.     Janelle Finley, OT on 10/27/2020 at 9:20 AM     no

## 2020-10-27 NOTE — ED NOTES
"Patient stating \"I need someone to call my gas company and get my propane filled with my fuel assistance and I need you to call the bank and ask them to cancel my truck payment.\"  This writer explained to patient that we are not able to do that, asked if there was someone we could call to help assist him.  He stated \"NO!\"  Patient offered phone for hearing impaired to assist with making phone calls.  Patient responded \" No, I can't use the phone here.\"   contacted to help assist with patient request.  Also spoke with  to discuss placement, there is no placement options at this time.  Also contacted [facility] for assistance.  Message left.     "

## 2020-10-27 NOTE — DISCHARGE INSTRUCTIONS
"SAFETY PLAN for Alberto Khan:    1.)  Alberto, it is most important that you are nice and treat any home care staff coming to help you with respect.    2.)  You have had a concussion and you are not to drive until you are cleared by your regular doctor for your safety and the safety of others on the road.    3.)  It is important to take your usual medications.    4.)  Use your oxygen at 2.5 Liters/min by NC to avoid confusion that happens when your oxygen gets low.    5.)  Work with Crisis Response Team to make sure you are safe at home.            \"I agree to the above Safety Plan and to be nice to the people helping me.\"      Signed:_____________________________________________________________ Date:____________________    Alberto Khan         Witness:    Signed:________________________________________________________________Date:__________________    Name:     Current Outpatient Medications   Medication Sig Dispense Refill    amoxicillin-clavulanate (AUGMENTIN) 875-125 MG tablet Take 1 tablet by mouth 3 times daily (with meals) for 6 days 18 tablet 0    aspirin 81 MG EC tablet Take 81 mg by mouth daily (with breakfast)      insulin aspart prot & aspart (NOVOLOG MIX 70/30 VIAL) (70-30) 100 UNIT/ML vial Inject 30 units subcutaneously in the AM and 35 units in the PM      lisinopril-hydrochlorothiazide (ZESTORETIC) 20-25 MG tablet Take 1 tablet by mouth every morning       Multiple Vitamins-Minerals (SENIOR MULTIVITAMIN PLUS PO) Take 1 tablet by mouth every morning       ferrous sulfate (FE TABS) 325 (65 Fe) MG EC tablet Take 325 mg by mouth every morning       ibuprofen (ADVIL/MOTRIN) 400 MG tablet Take 400 mg by mouth 4 times daily as needed for pain       Magnesium Oxide 420 MG TABS Take 420 mg by mouth every morning       metFORMIN (GLUCOPHAGE) 1000 MG tablet Take 1,000 mg by mouth 2 times daily (with meals)      simvastatin (ZOCOR) 80 MG tablet Take 40 mg by mouth At Bedtime      triamcinolone (KENALOG) 0.1 % " external cream Apply topically 2 times daily as needed for irritation Applies to feet        Oxygen at 2.5L/min by NC continuous.        Signed:_______________________________________________ Date:________________________    Zeeshan Garcia MD                     .

## 2020-10-27 NOTE — PROGRESS NOTES
:    I called Nafisa from [facility] to discuss discharge plan.  Iila stated [facility] will call patient every day and do a face to face on every 3rd day.  Nafisa is going to call his neighbor Dillon to make he has enough food in the house and update him.  Medical transportation through [facility] can transport him to home at discharge ([phone])  and help him get his phone set up when they arrive.  This writer will file a [facility] report with [facility] today and also contact his primary care provider Dr. Morris Murphy  from [facility] ([phone]) and inform him that his services were discontinued with [facility] and update him with discharge plan.  Updated RN staff and asked to review this with MD to see if he was supportive of this discharge plan.    EVANGELINA Edouard on 10/27/2020 at 1:31 PM    :    [facility] report made. Web reporting number #[id number].  I also placed call to Dr. Morris Murphy staff team from [facility] ([phone]) and they took a detailed note.     EVANGELINA Edouard on 10/27/2020 at 2:45 PM

## 2020-10-27 NOTE — ED NOTES
Social work updated this writer that [facility] is pulling out. They no longer will provide services for the patient r/t behaviors.

## 2020-10-28 NOTE — PROGRESS NOTES
Pt refused MDI.  Pt angry wants to go to [facility] can not see benefit if inhaler just wants [facility] be [facility] to [facility].    Thuy Scott, RT on 10/28/2020 at 5:46 PM

## 2020-10-28 NOTE — ED AVS SNAPSHOT
[facility]  [address]  [place] [address]  Phone: [phone]  Fax: [phone]                                    Alberto Khan   MRN: [id number]    Department: [facility]   Date of Visit: 10/28/2020           After Visit Summary Signature Page    I have received my discharge instructions, and my questions have been answered. I have discussed any challenges I see with this plan with the nurse or doctor.    ..........................................................................................................................................  Patient/Patient Representative Signature      ..........................................................................................................................................  Patient Representative Print Name and Relationship to Patient    ..................................................               ................................................  Date                                   Time    ..........................................................................................................................................  Reviewed by Signature/Title    ...................................................              ..............................................  Date                                               Time          22EPIC Rev 08/18
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
## 2020-10-28 NOTE — ED AVS SNAPSHOT
[facility]: [phone]                                              INTERAGENCY TRANSFER FORM - LAB / IMAGING / EKG / EMG RESULTS   10/28/2020                   Hospital Admission Date: 10/28/2020  ISABELLA BOYCE   : 1940  Sex: Male         Attending Provider: (none)   Allergies: Gabapentin, No Known Drug Allergies    Infection: Rule Out COVID-19   Service: EMERGENCY ME    Ht: 1.829 m (6')   Wt: 163.3 kg (360 lb)   Admission Wt: 163.3 kg (360 lb)    BMI: 48.82 kg/m 2   BSA: 2.88 m 2            Patient PCP Information     Provider PCP Type    Physician No Ref-Primary General         Lab Results - 3 Days      UA reflex to Microscopic and Culture [[id number]]  Resulted: 10/28/20 2252, Result status: Final result   Ordering provider: Zeeshan Garcia MD  10/28/20 173 Resulting lab: [facility]    Specimen Information    Type Source Collected On   Midstream Urine Urine clean catch 10/28/20 2247          Components    Component Value Reference Range Flag Lab   Color Urine Light Yellow     [facility]   Appearance Urine Clear     [facility]   Glucose Urine Negative NEG^Negative mg/dL   [facility]   Bilirubin Urine Negative NEG^Negative   [facility]   Ketones Urine Negative NEG^Negative mg/dL   [facility]   Specific [place] Urine 1.027 1.003 - 1.035   [facility]   Blood Urine Negative NEG^Negative   [facility]   pH Urine 7.0 5.0 - 7.0 pH   [facility]   Protein Albumin Urine Negative NEG^Negative mg/dL   [facility]   Urobilinogen mg/dL Normal 0.0 - 2.0 mg/dL   [facility]   Nitrite Urine Negative NEG^Negative   [facility]   Leukocyte Esterase Urine Negative NEG^Negative   [facility]   Source Midstream Urine     [facility]            Troponin GH [[id number]]  Resulted: 10/28/20 1826, Result status: Final result   Ordering provider: Zeeshan Garcia MD  10/28/20 1732 Resulting lab: [facility]    Specimen Information    Type Source Collected On   Blood   10/28/20 1757          Components    Component Value Reference Range Flag Lab   Troponin 12.6 <34.0 pg/mL   [facility]            Nt probnp inpatient (BNP) [[id number]]  Resulted: 10/28/20 1825, Result status: Final result   Ordering provider: Zeeshan Garcia MD  10/28/20 1732 Resulting lab: [facility]    Specimen Information    Type Source Collected On   Blood   10/28/20 8230          Components    Component Value Reference Range Flag Lab   N-Terminal Pro BNP Inpatient 77 0 - 100 pg/mL   [facility]   Comment:           Reference range shown and results flagged as abnormal are suggested inpatient   cut points for confirming diagnosis if CHF in an acute setting. Establishing a   baseline value for each individual patient is useful for follow-up. An   inpatient or emergency department NT-proPBNP <300 pg/mL effectively rules out   acute CHF, with 99% negative predictive value.  The outpatient non-acute reference range for ruling out CHF is:   0-125 pg/mL (age 18 to less than 75)   0-450 pg/mL (age 75 yrs and older)              Comprehensive metabolic panel [[id number]] (Abnormal)  Resulted: 10/28/20 1824, Result status: Final result   Ordering provider: Zeeshan Garcia MD  10/28/20 1732 Resulting lab: [facility]    Specimen  Information    Type Source Collected On   Blood   10/28/20 0367          Components    Component Value Reference Range Flag Lab   Sodium 132 134 - 144 mmol/L L [facility]   Potassium 4.2 3.5 - 5.1 mmol/L   [facility]   Chloride 93 98 - 107 mmol/L L [facility]   Carbon Dioxide 34 21 - 31 mmol/L H [facility]   Anion Gap 5 3 - 14 mmol/L   [facility]   Glucose 144 70 - 105 mg/dL H [facility]   Urea Nitrogen 20 7 - 25 mg/dL   [facility]   Creatinine 1.03 0.70 - 1.30 mg/dL   [facility]   GFR Estimate 70 >60 mL/min/{1.73_m2}   [facility]   GFR Estimate If Black 85 >60 mL/min/{1.73_m2}   [facility]   Calcium 9.4 8.6 - 10.3 mg/dL   [facility]   Bilirubin Total 0.4 0.3 - 1.0 mg/dL   [facility]   Albumin 3.6 3.5 - 5.7 g/dL   [facility]   Protein Total 6.7 6.4 - 8.9 g/dL   [facility]   Alkaline Phosphatase 53 34 - 104 U/L   [facility]   ALT 36 7 - 52 U/L   [facility]   AST 33 13 - 39 U/L   [facility]            Lactic acid whole blood [[id number]]  Resulted: 10/28/20 1806, Result status: Final result   Ordering provider: Zeeshan Garcia MD  10/28/20 3756 Resulting lab: [facility]    Specimen Information    Type Source Collected On   Blood   10/28/20 7677          Components    Component Value  Reference Range Flag Lab   Lactic Acid 1.0 0.7 - 2.0 mmol/L   [facility]            Blood gas venous and oxyhgb [[id number]] (Abnormal)  Resulted: 10/28/20 1805, Result status: Final result   Ordering provider: Zeeshan Garcia MD  10/28/20 1757 Resulting lab: [facility]    Specimen Information    Type Source Collected On   Blood   10/28/20 1758          Components    Component Value Reference Range Flag Lab   Ph Venous 7.41 7.32 - 7.43 pH   [facility]   PCO2 Venous 56 40 - 50 mm Hg H [facility]   PO2 Venous 29 25 - 47 mm Hg   [facility]   Bicarbonate Venous 36 21 - 28 mmol/L H [facility]   FIO2 28     [facility]   Oxyhemoglobin Venous 47 %   [facility]            CBC with platelets differential [[id number]] (Abnormal)  Resulted: 10/28/20 1804, Result status: Final result   Ordering provider: Zeeshan Garcia MD  10/28/20 1732 Resulting lab: [facility]    Specimen Information    Type Source Collected On   Blood   10/28/20 1757          Components    Component Value Reference Range Flag Lab   WBC 10.1 4.0 - 11.0 10e9/L   [facility]   RBC Count 3.90 4.4 - 5.9 10e12/L L [facility]   Hemoglobin 10.2 13.3 - 17.7 g/dL L [facility]   Hematocrit 33.0 40.0 - 53.0 % L [facility]   MCV 85 78 - 100 fl   [facility]   MCH 26.2 26.5 - 33.0 pg L [facility]   MCHC 30.9 31.5 - 36.5 g/dL L [facility]   RDW  15.3 10.0 - 15.0 % H [facility]   Platelet Count 307 150 - 450 10e9/L   [facility]   Diff Method Automated Method     [facility]   % Neutrophils 70.0 %   [facility]   % Lymphocytes 16.6 %   [facility]   % Monocytes 8.7 %   [facility]   % Eosinophils 3.2 %   [facility]   % Basophils 0.7 %   [facility]   % Immature Granulocytes 0.8 %   [facility]   Absolute Neutrophil 7.1 1.6 - 8.3 10e9/L   [facility]   Absolute Lymphocytes 1.7 0.8 - 5.3 10e9/L   [facility]   Absolute Monocytes 0.9 0.0 - 1.3 10e9/L   [facility]   Absolute Eosinophils 0.3 0.0 - 0.7 10e9/L   [facility]   Absolute Basophils 0.1 0.0 - 0.2 10e9/L   [facility]   Abs Immature Granulocytes 0.1 0 - 0.4 10e9/L   [facility]            Testing Performed By     Lab - Abbreviation Name Director Address Valid Date Range    1743 - [facility] Unknown [address]  [facility] [address] 12/09/19 0801 - Present            Unresulted Labs (24h ago, onward)     Start       Ordered    10/28/20 1833  Symptomatic COVID-19 Virus (Coronavirus) by PCR  (COVID-19 Virus (Coronavirus) by PCR Universal Testing )  STAT,   STAT     Comments: Patient spent 10/19-24/2020 in our Hospital and then 10/24-27/2020 in our ED with exposure to Covid19.     Question Answer Comment   Status of  "patient? Other    Reason for \"Other\": dementia and behavioral concerns and will plan on commitment with possible behavioral health or dementia care unit transfer.    Hospitalized specifically for COVID-19 or suspected COVID-19? No    Admitted to ICU specifically for COVID-19 or suspected COVID-19? No    Asymptomatic or Symptomatic: Symptomatic    Symptomatic for COVID-19 as defined by CDC? Unknown    Employed in healthcare setting? No    Close contact of Employee or Provider/Resident/Faculty at [facility]? Yes - close contact of Employee or Provider/Resident/Faculty at [facility]    Resident in a congregate care/living setting? No    First COVID-19 test? No        10/28/20 1835               Imaging Results - 3 Days      CT Chest w Contrast [[id number]]  Resulted: 10/28/20 1955, Result status: Final result   Ordering provider: Zeeshan Garcia MD  10/28/20 1732 Resulted by: Ravin Cr MD   Performed: 10/28/20 1759 - 10/28/20 1934 Accession number: [id number]   Resulting lab: RADIOLOGY RESULTS   Narrative:  CT CHEST W CONTRAST, CT ABDOMEN PELVIS W CONTRAST    CLINICAL HISTORY: Male, age 79 years, Rib fx suspected, post trauma;    Comparison:  CT scan chest abdomen pelvis 10/19/2020    TECHNIQUE:  CT was performed of the chest, abdomen and pelvis  after  the administration of IV  contrast.   Sagittal, coronal and axial reconstructions were reviewed.     FINDINGS:  Chest CT:   There is been significant interval clearing of consolidation and  atelectasis seen previously in the right lung. The lungs currently  appear to be clear aside from mild atelectasis in the periphery of  both lungs.    Vascular structures of the chest demonstrate no acute abnormality.  There is no lymphadenopathy.    There is a nondisplaced fracture involving the anterior aspect of the  left fifth rib, anterior aspect of the left sixth rib, left seventh  rib and lateral aspect of the left eighth rib, and ninth " rib.    Healed versus healing fractures are seen in a number of the ribs of  the right hemithorax. There is no evidence of an acute fracture of the  thoracic spine. Sternum appears to be intact there is suggestion of a  healed, healing fracture involving the superior aspect of the sternal  body.    Small right-sided pleural effusion. No evidence of pneumothorax.    Abdomen/Pelvis CT:  Stomach and duodenum are similar in appearance.    Liver again demonstrates hepatic steatosis. A 6 mm low dense lesion is  seen posteriorly within the right lobe that is too small to  characterize.    The gallbladder surgically absent. Biliary tree is grossly normal.    Pancreas again demonstrates numerous calcifications without evidence  of active pancreatitis.    The spleen is normal.    Adrenal glands are normal.    Kidneys, ureters and urinary bladder are grossly normal.    Vascular structures demonstrate no evidence of acute abnormality    Large and small bowel are grossly normal. The appendix appears to be  surgically absent.    Degenerative changes are again seen within the mid and lower lumbar  spine. There is no distinct evidence of an acute lumbar fracture. Bony  pelvis is intact.     Impression:  IMPRESSION:   Nondisplaced fractures involving the anterior and lateral aspects of  the left fifth, sixth, seventh, eighth and ninth ribs. No  pneumothorax.    Healed versus healing fractures involving a number of the right ribs  and upper aspect of the sternal body.    Consolidation/pneumonia seen previously within the right lung has near  completely resolved. There is only minimal atelectasis persist  throughout both lungs.    JANIE CR MD      CT Abdomen Pelvis w Contrast [[id number]]  Resulted: 10/28/20 1955, Result status: Final result   Ordering provider: Zeeshan Garcia MD  10/28/20 1732 Resulted by: Janie Cr MD   Performed: 10/28/20 1807 - 10/28/20 1935 Accession number: [id number]   Resulting lab:  RADIOLOGY RESULTS   Narrative:  CT CHEST W CONTRAST, CT ABDOMEN PELVIS W CONTRAST    CLINICAL HISTORY: Male, age 79 years, Rib fx suspected, post trauma;    Comparison:  CT scan chest abdomen pelvis 10/19/2020    TECHNIQUE:  CT was performed of the chest, abdomen and pelvis  after  the administration of IV  contrast.   Sagittal, coronal and axial reconstructions were reviewed.     FINDINGS:  Chest CT:   There is been significant interval clearing of consolidation and  atelectasis seen previously in the right lung. The lungs currently  appear to be clear aside from mild atelectasis in the periphery of  both lungs.    Vascular structures of the chest demonstrate no acute abnormality.  There is no lymphadenopathy.    There is a nondisplaced fracture involving the anterior aspect of the  left fifth rib, anterior aspect of the left sixth rib, left seventh  rib and lateral aspect of the left eighth rib, and ninth rib.    Healed versus healing fractures are seen in a number of the ribs of  the right hemithorax. There is no evidence of an acute fracture of the  thoracic spine. Sternum appears to be intact there is suggestion of a  healed, healing fracture involving the superior aspect of the sternal  body.    Small right-sided pleural effusion. No evidence of pneumothorax.    Abdomen/Pelvis CT:  Stomach and duodenum are similar in appearance.    Liver again demonstrates hepatic steatosis. A 6 mm low dense lesion is  seen posteriorly within the right lobe that is too small to  characterize.    The gallbladder surgically absent. Biliary tree is grossly normal.    Pancreas again demonstrates numerous calcifications without evidence  of active pancreatitis.    The spleen is normal.    Adrenal glands are normal.    Kidneys, ureters and urinary bladder are grossly normal.    Vascular structures demonstrate no evidence of acute abnormality    Large and small bowel are grossly normal. The appendix appears to be  surgically  absent.    Degenerative changes are again seen within the mid and lower lumbar  spine. There is no distinct evidence of an acute lumbar fracture. Bony  pelvis is intact.     Impression:  IMPRESSION:   Nondisplaced fractures involving the anterior and lateral aspects of  the left fifth, sixth, seventh, eighth and ninth ribs. No  pneumothorax.    Healed versus healing fractures involving a number of the right ribs  and upper aspect of the sternal body.    Consolidation/pneumonia seen previously within the right lung has near  completely resolved. There is only minimal atelectasis persist  throughout both lungs.    JANIE CR MD      CT Head w/o Contrast [[id number]]  Resulted: 10/28/20 1944, Result status: Final result   Ordering provider: Zeeshan Garcia MD  10/28/20 1759 Resulted by: Janie Cr MD   Performed: 10/28/20 1808 - 10/28/20 1934 Accession number: [id number]   Resulting lab: RADIOLOGY RESULTS   Narrative:  PROCEDURE: CT HEAD W/O CONTRAST   10/28/2020 7:34 PM    HISTORY:Male, age,  79 years, , , Head trauma, delayed recovery,  follow up    COMPARISON: 10/19/2020    TECHNIQUE: CT of the brain without contrast.    FINDINGS: Ventricles and sulci are prominent in size and shape. Gray  and white matter demonstrate scattered areas of decreased density.    1.2 cm extra-axial calcification in the right parietal region is  similar in appearance suggesting a calcified meningioma..    The bones are unremarkable. No fracture.      Impression:  IMPRESSION:   No evidence of acute intracranial hemorrhage or fracture.     Generalized atrophy and white matter changes suggesting small vessel  disease are similar in appearance compared to the prior study.    1.2 cm extra-axial calcification in the right parietal region is  unchanged likely representing calcified meningioma.    JNAIE CR MD      XR Chest Port 1 View [[id number]]  Resulted: 10/28/20 1816, Result status: Final result   Ordering  provider: Zeeshan Garcia MD  10/28/20 1655 Resulted by: Janie Barksdale MD   Performed: 10/28/20 1715 - 10/28/20 1806 Accession number: [id number]   Resulting lab: RADIOLOGY RESULTS   Narrative:  Procedure:XR CHEST PORT 1 VW    Clinical history:Male, 79 years, dyspnea    Technique: Single view was obtained.    Comparison: 10/19/2020    Findings: The cardiac silhouette is normal. The pulmonary vasculature  is normal.    The lungs demonstrate residual opacification at the right lung base.  Bony structures are unremarkable.     Impression:  Impression:   Resolving right basilar pneumonia. Mild consolidation/pneumonia  persists.    JANIE BARKSDALE MD      Testing Performed By     Lab - Abbreviation Name Director Address Valid Date Range    104 - [facility] RADIOLOGY RESULTS Unknown Unknown 02/16/05 1553 - Present            Encounter-Level Documents:    There are no encounter-level documents.     Order-Level Documents:    There are no order-level documents.

## 2020-10-29 NOTE — ED NOTES
Pt was up in the [address] without his oxygen on, without his walker. Pt is demanding to go home. Demanding that we let him out. Pt will not listen to staff. He was not redirectable. Code green called over head for more staff to assist with pt.   MD, house supervisor arrives.

## 2020-10-29 NOTE — PROGRESS NOTES
:    Patient presented to the ED from home by Med[facility] last night.  I called [facility] and they stated that Jose is on her way to meet with patient again.  I also placed call to his [facility]  as she wanted to be contacted again if he came back to the ED (Aida Goldman [phone]).  I spoke with Sharon BASSETT and Jose and she stated she feels [facility] would [facility] and is making some calls right now.  She is hoping to get him placed either later tonight or possibly tomorrow.  Jose stated that she called Amadou at [facility] to stop the petition for commitment paperwork.  Apparently the ED possibly filed this last night.  [facility] would [facility] [place]   will follow as needed.    EVANGELINA Edouard on 10/29/2020 at 2:57 PM    :    [facility] not heard final acceptance from [facility], but wanting more information on patient and his medical needs.  [facility] stated that she thought maybe Mendel Robbins could accpet him for 10 days and medically monitor him and she discussed with MD and he was not comfortable sending patient to a crisis bed and thought he needed a higher level of care.  Updated EVANGELINA Mackenzie on 10/29/2020 at 3:45 PM

## 2020-10-29 NOTE — ED NOTES
Signed commitment MD samia notes/labs/summary of ED stay from previous ER visit and this ER visit faxed to [facility] ([phone]).  Emilia Willingham RN on 10/29/2020 at 6:23 AM

## 2020-10-29 NOTE — ED NOTES
"Pt requesting something for pain.  States \"It hurts where it always hurts.\" when asked.  MD updated.  Orders received. Emilai Willingham RN on 10/29/2020 at 4:15 AM    "

## 2020-10-29 NOTE — DISCHARGE INSTRUCTIONS
Alberto,  It was nice to see you again.  As we talked, I'm glad you will be at a place where people can help you with your medications and food and getting around safely.    [facility] [place].  They are recommending a 10 day observation at [facility] and then reassess.      You can use 2 tylenol and 2 ibuprofen 4 times a day for pain.  You can substitute a Percocet for a tylenol at bedtime for worse pain.    You will only take your Augmentin for 1 more day.    Oxygen at 2L/min by nasal canula to keep sats >90.    Take 10 deep breaths an hour while awake to keep your lungs clear.    Dr. Chetan Garcia    Current Outpatient Medications   Medication Sig Dispense Refill    acetaminophen (TYLENOL) 325 MG tablet Take 2 tablets (650 mg) by mouth every 6 hours as needed for fever or pain 1 tablet 0    amoxicillin-clavulanate (AUGMENTIN) 875-125 MG tablet Take 1 tablet by mouth 3 times daily (with meals) for 1 day 3 tablet 0    oxyCODONE-acetaminophen (PERCOCET) 5-325 MG tablet Take 1 tablet by mouth nightly as needed for moderate to severe pain 6 tablet 0    aspirin 81 MG EC tablet Take 81 mg by mouth daily (with breakfast)      ferrous sulfate (FE TABS) 325 (65 Fe) MG EC tablet Take 325 mg by mouth every morning       ibuprofen (ADVIL/MOTRIN) 400 MG tablet Take 400 mg by mouth 4 times daily as needed for pain       insulin aspart prot & aspart (NOVOLOG MIX 70/30 VIAL) (70-30) 100 UNIT/ML vial Inject 30 units subcutaneously in the AM and 35 units in the PM      lisinopril-hydrochlorothiazide (ZESTORETIC) 20-25 MG tablet Take 1 tablet by mouth every morning       Magnesium Oxide 420 MG TABS Take 420 mg by mouth every morning       metFORMIN (GLUCOPHAGE) 1000 MG tablet Take 1,000 mg by mouth 2 times daily (with meals)      Multiple Vitamins-Minerals (SENIOR MULTIVITAMIN PLUS PO) Take 1 tablet by mouth every morning       simvastatin (ZOCOR) 80 MG tablet Take 40 mg by mouth At Bedtime      triamcinolone  (KENALOG) 0.1 % external cream Apply topically 2 times daily as needed for irritation Applies to feet

## 2020-10-29 NOTE — ED NOTES
Spoke to Praveen . Praveen stated she would [facility] out to the [facility]  and update them on patient being in the ER. Will follow-up.

## 2020-10-29 NOTE — ED NOTES
"Pt in bed but stated \"I don't need it\" when RN told him to place his O2 on.  Allowed RN to place oxygen on him after telling him he needed it.    "

## 2020-10-29 NOTE — PHARMACY-ADMISSION MEDICATION HISTORY
Pharmacy -- Admission Medication Reconciliation    Prior to admission (PTA) medications were reviewed and the patient's PTA medication list was updated.    Sources Consulted: Prior med recs (10/20 and 10/25)- patient was hospitalized 10/19 - 10/24 and in our emergency department later on 10/24 to 10/27    The reliability of this Medication Reconciliation is: Reliability: Borderline reliable  This med rec is borderline reliable, as last doses of home medications were unable to accurately be assessed.    The following significant changes were made:  -Removed Bisacodyl tablets  -Removed PEG-3350 with electrolytes  -Removed duplicate Magnesium      In addition, the patient's allergies were reviewed and left as follows:   Allergies: Gabapentin and No known drug allergies     Medication barriers identified: unable to assess patient's adherence to medications at home. Last institutional doses administered in ED on 10/27   Medication adherence concerns: yes, but unable to accurately assess   Understanding of emergency medications: per 10/25 med rec, patient has no glucose tabs at home.    Mejia Mccord [facility], 10/29/2020,  9:48 AM

## 2020-10-30 NOTE — TELEPHONE ENCOUNTER
States pt has a prescription for ibuprofen 400 mg. She said that pt takes 1 tablet but pt was told he would be able to take 2. They are needing orders for that. States pt was seen in the ER recently with fractured ribs and was prescribed oxycodone but they don't have that yet and would prefer not to have to give him that medication as pt does have some confusion. Hoping that 800 mg of ibuprofen would be enough.

## 2020-10-30 NOTE — TELEPHONE ENCOUNTER
Ok to take ibuprofen 800 mg for pain relief every six hours.   Nisha Lambert PA-C on 10/30/2020 at 1:51 PM

## 2020-10-30 NOTE — TELEPHONE ENCOUNTER
Notified Moira/Mendel Robbins of providers note.  Jacquie Brooks LPN ....................  10/30/2020   1:59 PM

## 2022-08-23 NOTE — ED NOTES
Blood glucose 186, primary RN notified.    Topical Sulfur Applications Pregnancy And Lactation Text: This medication is Pregnancy Category C and has an unknown safety profile during pregnancy. It is unknown if this topical medication is excreted in breast milk.

## (undated) RX ORDER — FENTANYL CITRATE 50 UG/ML
INJECTION, SOLUTION INTRAMUSCULAR; INTRAVENOUS
Status: DISPENSED
Start: 2020-01-01

## (undated) RX ORDER — OXYCODONE HYDROCHLORIDE 5 MG/1
TABLET ORAL
Status: DISPENSED
Start: 2020-01-01

## (undated) RX ORDER — MULTIPLE VITAMINS W/ MINERALS TAB 9MG-400MCG
TAB ORAL
Status: DISPENSED
Start: 2020-01-01

## (undated) RX ORDER — OXYCODONE AND ACETAMINOPHEN 5; 325 MG/1; MG/1
TABLET ORAL
Status: DISPENSED
Start: 2020-01-01

## (undated) RX ORDER — SODIUM CHLORIDE 9 MG/ML
INJECTION, SOLUTION INTRAVENOUS
Status: DISPENSED
Start: 2020-01-01

## (undated) RX ORDER — LIDOCAINE HYDROCHLORIDE 10 MG/ML
INJECTION, SOLUTION EPIDURAL; INFILTRATION; INTRACAUDAL; PERINEURAL
Status: DISPENSED
Start: 2020-01-01

## (undated) RX ORDER — ZIPRASIDONE MESYLATE 20 MG/ML
INJECTION, POWDER, LYOPHILIZED, FOR SOLUTION INTRAMUSCULAR
Status: DISPENSED
Start: 2020-01-01

## (undated) RX ORDER — ACETAMINOPHEN 325 MG/1
TABLET ORAL
Status: DISPENSED
Start: 2020-01-01

## (undated) RX ORDER — CEFTRIAXONE SODIUM 1 G/50ML
INJECTION, SOLUTION INTRAVENOUS
Status: DISPENSED
Start: 2020-01-01

## (undated) RX ORDER — WATER 10 ML/10ML
INJECTION INTRAMUSCULAR; INTRAVENOUS; SUBCUTANEOUS
Status: DISPENSED
Start: 2020-01-01

## (undated) RX ORDER — ASPIRIN 81 MG/1
TABLET, CHEWABLE ORAL
Status: DISPENSED
Start: 2020-01-01

## (undated) RX ORDER — MAGNESIUM OXIDE 400 MG/1
TABLET ORAL
Status: DISPENSED
Start: 2020-01-01

## (undated) RX ORDER — LOPERAMIDE HCL 2 MG
CAPSULE ORAL
Status: DISPENSED
Start: 2020-01-01